# Patient Record
Sex: FEMALE | Race: WHITE | Employment: OTHER | URBAN - METROPOLITAN AREA
[De-identification: names, ages, dates, MRNs, and addresses within clinical notes are randomized per-mention and may not be internally consistent; named-entity substitution may affect disease eponyms.]

---

## 2017-01-18 ENCOUNTER — NURSE ONLY (OUTPATIENT)
Dept: INTERNAL MEDICINE CLINIC | Facility: CLINIC | Age: 77
End: 2017-01-18

## 2017-01-18 ENCOUNTER — LAB ENCOUNTER (OUTPATIENT)
Dept: LAB | Age: 77
End: 2017-01-18
Attending: INTERNAL MEDICINE
Payer: MEDICARE

## 2017-01-18 DIAGNOSIS — R73.9 HYPERGLYCEMIA: ICD-10-CM

## 2017-01-18 DIAGNOSIS — Z13.21 ENCOUNTER FOR VITAMIN DEFICIENCY SCREENING: ICD-10-CM

## 2017-01-18 DIAGNOSIS — Z00.00 ROUTINE GENERAL MEDICAL EXAMINATION AT A HEALTH CARE FACILITY: ICD-10-CM

## 2017-01-18 DIAGNOSIS — E78.00 PURE HYPERCHOLESTEROLEMIA: ICD-10-CM

## 2017-01-18 DIAGNOSIS — Z00.00 ROUTINE GENERAL MEDICAL EXAMINATION AT A HEALTH CARE FACILITY: Primary | ICD-10-CM

## 2017-01-18 LAB
25-HYDROXYVITAMIN D (TOTAL): 29.4 NG/ML (ref 30–100)
ALBUMIN SERPL-MCNC: 3.9 G/DL (ref 3.5–4.8)
ALP LIVER SERPL-CCNC: 130 U/L (ref 55–142)
ALT SERPL-CCNC: 19 U/L (ref 14–54)
AST SERPL-CCNC: 20 U/L (ref 15–41)
BASOPHILS # BLD AUTO: 0.03 X10(3) UL (ref 0–0.1)
BASOPHILS NFR BLD AUTO: 0.4 %
BILIRUB SERPL-MCNC: 0.5 MG/DL (ref 0.1–2)
BUN BLD-MCNC: 16 MG/DL (ref 8–20)
CALCIUM BLD-MCNC: 9.2 MG/DL (ref 8.3–10.3)
CHLORIDE: 107 MMOL/L (ref 101–111)
CO2: 28 MMOL/L (ref 22–32)
CREAT BLD-MCNC: 0.86 MG/DL (ref 0.55–1.02)
EOSINOPHIL # BLD AUTO: 0.05 X10(3) UL (ref 0–0.3)
EOSINOPHIL NFR BLD AUTO: 0.7 %
ERYTHROCYTE [DISTWIDTH] IN BLOOD BY AUTOMATED COUNT: 13.1 % (ref 11.5–16)
EST. AVERAGE GLUCOSE BLD GHB EST-MCNC: 120 MG/DL (ref 68–126)
GLUCOSE BLD-MCNC: 92 MG/DL (ref 70–99)
HBA1C MFR BLD HPLC: 5.8 % (ref ?–5.7)
HCT VFR BLD AUTO: 44.4 % (ref 34–50)
HGB BLD-MCNC: 15 G/DL (ref 12–16)
IMMATURE GRANULOCYTE COUNT: 0.03 X10(3) UL (ref 0–1)
IMMATURE GRANULOCYTE RATIO %: 0.4 %
LYMPHOCYTES # BLD AUTO: 2.18 X10(3) UL (ref 0.9–4)
LYMPHOCYTES NFR BLD AUTO: 31.2 %
M PROTEIN MFR SERPL ELPH: 7.1 G/DL (ref 6.1–8.3)
MCH RBC QN AUTO: 30.6 PG (ref 27–33.2)
MCHC RBC AUTO-ENTMCNC: 33.8 G/DL (ref 31–37)
MCV RBC AUTO: 90.6 FL (ref 81–100)
MONOCYTES # BLD AUTO: 0.62 X10(3) UL (ref 0.1–0.6)
MONOCYTES NFR BLD AUTO: 8.9 %
NEUTROPHIL ABS PRELIM: 4.08 X10 (3) UL (ref 1.3–6.7)
NEUTROPHILS # BLD AUTO: 4.08 X10(3) UL (ref 1.3–6.7)
NEUTROPHILS NFR BLD AUTO: 58.4 %
PLATELET # BLD AUTO: 205 10(3)UL (ref 150–450)
POTASSIUM SERPL-SCNC: 4.6 MMOL/L (ref 3.6–5.1)
RBC # BLD AUTO: 4.9 X10(6)UL (ref 3.8–5.1)
RED CELL DISTRIBUTION WIDTH-SD: 43 FL (ref 35.1–46.3)
SODIUM SERPL-SCNC: 141 MMOL/L (ref 136–144)
TSI SER-ACNC: 2.57 MIU/ML (ref 0.35–5.5)
WBC # BLD AUTO: 7 X10(3) UL (ref 4–13)

## 2017-01-18 PROCEDURE — 82306 VITAMIN D 25 HYDROXY: CPT

## 2017-01-18 PROCEDURE — 84443 ASSAY THYROID STIM HORMONE: CPT

## 2017-01-18 PROCEDURE — 36415 COLL VENOUS BLD VENIPUNCTURE: CPT

## 2017-01-18 PROCEDURE — 92551 PURE TONE HEARING TEST AIR: CPT | Performed by: INTERNAL MEDICINE

## 2017-01-18 PROCEDURE — 80053 COMPREHEN METABOLIC PANEL: CPT

## 2017-01-18 PROCEDURE — 85025 COMPLETE CBC W/AUTO DIFF WBC: CPT

## 2017-01-18 PROCEDURE — 83036 HEMOGLOBIN GLYCOSYLATED A1C: CPT

## 2017-01-18 PROCEDURE — 99173 VISUAL ACUITY SCREEN: CPT | Performed by: INTERNAL MEDICINE

## 2017-01-18 PROCEDURE — 83704 LIPOPROTEIN BLD QUAN PART: CPT

## 2017-01-18 PROCEDURE — 93000 ELECTROCARDIOGRAM COMPLETE: CPT | Performed by: INTERNAL MEDICINE

## 2017-01-22 LAB
HDL CHOLESTEROL: 75 MG/DL
HDL PARTICLE NUMBER: 37.3 UMOL/L
HDL SIZE: 9.7 NM
LARGE HDL PARTICLE NUMBER: 11.3 UMOL/L
LARGE VLDL PARTICLE NUMBER: 5.1 NMOL/L
LDL CHOLESTEROL: 131 MG/DL
LDL PARTICLE NUMBER BY NMR: 1451 NMOL/L
LDL PARTICLE SIZE: 21.7 NM
LDL SIZE: 21.7 NM
LP INSULIN RESISTANCE SCORE: 34
SMALL LDL PARTICLE NUMBER: 317 NMOL/L
SMALL LDL-P: 317 NMOL/L
TOTAL CHOLESTEROL: 227 MG/DL
TRIGLYCERIDES: 106 MG/DL
VLDL SIZE: 50.7 NM

## 2017-01-23 ENCOUNTER — OFFICE VISIT (OUTPATIENT)
Dept: INTERNAL MEDICINE CLINIC | Facility: CLINIC | Age: 77
End: 2017-01-23

## 2017-01-23 ENCOUNTER — APPOINTMENT (OUTPATIENT)
Dept: LAB | Age: 77
End: 2017-01-23
Attending: INTERNAL MEDICINE
Payer: MEDICARE

## 2017-01-23 VITALS
BODY MASS INDEX: 27.99 KG/M2 | WEIGHT: 156 LBS | HEIGHT: 62.5 IN | TEMPERATURE: 98 F | HEART RATE: 76 BPM | DIASTOLIC BLOOD PRESSURE: 84 MMHG | SYSTOLIC BLOOD PRESSURE: 126 MMHG | RESPIRATION RATE: 16 BRPM

## 2017-01-23 DIAGNOSIS — L65.9 HAIR LOSS: ICD-10-CM

## 2017-01-23 DIAGNOSIS — E78.00 PURE HYPERCHOLESTEROLEMIA: ICD-10-CM

## 2017-01-23 DIAGNOSIS — Z00.01 ENCOUNTER FOR GENERAL ADULT MEDICAL EXAMINATION WITH ABNORMAL FINDINGS: Primary | ICD-10-CM

## 2017-01-23 DIAGNOSIS — G47.33 OBSTRUCTIVE SLEEP APNEA (ADULT) (PEDIATRIC): ICD-10-CM

## 2017-01-23 DIAGNOSIS — R63.5 ABNORMAL WEIGHT GAIN: ICD-10-CM

## 2017-01-23 DIAGNOSIS — K21.00 GASTROESOPHAGEAL REFLUX DISEASE WITH ESOPHAGITIS: ICD-10-CM

## 2017-01-23 DIAGNOSIS — N39.41 URGE INCONTINENCE: ICD-10-CM

## 2017-01-23 LAB
C-REACTIVE PROTEIN: <0.29 MG/DL (ref ?–1)
SED RATE-ML: 11 MM/HR (ref 0–25)

## 2017-01-23 PROCEDURE — 85652 RBC SED RATE AUTOMATED: CPT

## 2017-01-23 PROCEDURE — 86038 ANTINUCLEAR ANTIBODIES: CPT

## 2017-01-23 PROCEDURE — 84165 PROTEIN E-PHORESIS SERUM: CPT

## 2017-01-23 PROCEDURE — 96160 PT-FOCUSED HLTH RISK ASSMT: CPT | Performed by: INTERNAL MEDICINE

## 2017-01-23 PROCEDURE — G0439 PPPS, SUBSEQ VISIT: HCPCS | Performed by: INTERNAL MEDICINE

## 2017-01-23 PROCEDURE — 99397 PER PM REEVAL EST PAT 65+ YR: CPT | Performed by: INTERNAL MEDICINE

## 2017-01-23 PROCEDURE — 86334 IMMUNOFIX E-PHORESIS SERUM: CPT

## 2017-01-23 PROCEDURE — 83883 ASSAY NEPHELOMETRY NOT SPEC: CPT

## 2017-01-23 PROCEDURE — 36415 COLL VENOUS BLD VENIPUNCTURE: CPT

## 2017-01-23 PROCEDURE — 86140 C-REACTIVE PROTEIN: CPT

## 2017-01-23 NOTE — PROGRESS NOTES
Marilyn name: Nicolas Brady  /Sex/Age: 814/1940 67year old female  Enc.  Date: 2017     Visit Information:  CC: Nicolas Brady is here for the [facility]ness Exam.  We are happy to be caring for you, Froylan Catalan, and are ready to support your effo CHOLEST 227* 01/18/2017   TRIG 130 11/17/2015   HDL 75 01/18/2017   * 01/18/2017   VLDL 26 11/17/2015   TCHDLRATIO 3.03 11/17/2015   NONHDLC 128 11/17/2015   CHOLHDLRATIO 3.3 04/19/2011       Patient Active Problem List:     Obstructive sleep apne as of 1/23/2017. Allergies:  No Known Allergies    OB/GYN History:  Patient's last menstrual period was 07/23/1992.       Family History:  Family History   Problem Relation Age of Onset   • Heart Attack Paternal Grandfather    • Alzheimer's [Other] Rebekah Fontana Good    How do you maintain positive mental well-being?: Social Interaction;Puzzles; Visiting Friends; Visiting Family    If you are a male age 38-65 or a female age 47-67, do you take aspirin?: No    Have you had any immunizations at another office such as for pain and visual disturbance. Respiratory: Negative for cough, chest tightness, shortness of breath and wheezing. Cardiovascular: Negative for chest pain, palpitations.    Gastrointestinal: Negative for nausea, vomiting, abdominal pain, diarrhea, con Low                   < 1000  Moderate         1000 - 1299  Borderline-High  1300 - 1599  High             1600 - 2000  Very High             > 2000  Performed at: [employer] [facility], [facility], [place], [address] [place] [address]   Small LDL Particle Number Saleem fully established.   Performed at: [employer] [facility], [facility], [place], [address] [facility]   Large HDL Particle Number Date: 01/18/2017   Value: 11.3  Ref Range >=4.8 umol/L Status: Final    Comment: Performed at: [employer] [facility], [facility], Resistant-->                        Percentile in Reference Population    Large VLDL-P      Low     25th     50th     75th     High                      <0.9    0.9      2.7      6.9      >6.9                                                             . Comment: Performed at: [facility] [address] [place] [address], [address] [place] [address]   LDL Size Date: 01/18/2017   Value: 21.7  Ref Range >=20.8 nm Status: Final    Comment: Performed at: [facility], [place] [address] [place] [address], [address] [place] [address]   [address] [place] [address] 6.4%      Diabetic:         >6.4%      Diabetic Control: <7.0%            Estimated Average Glucose Date: 01/18/2017   Value: 120  Ref Range  mg/dL Status: Final    Comment:   eAG is the estimated average glucose calculated from Hgb A1c according to x10(3) uL Status: Final   Monocyte Absolute Date: 01/18/2017   Value: 0.62* Ref Range 0.10-0.60 x10(3) uL Status: Final   Eosinophil Absolute Date: 01/18/2017   Value: 0.05  Ref Range 0.00-0.30 x10(3) uL Status: Final   Basophil Absolute Date: 01/18/2017 meat, fish and processed foods. The next healthiest diet is the Mediterranean diet which allows fish, wine and olives in addition to the above foods. Exercise regularly 30 minutes 5-6 days a week.  This amount has been shown to protect your heart and cristina

## 2017-01-24 LAB — ANA SCREEN: NEGATIVE

## 2017-01-25 PROBLEM — R63.5 ABNORMAL WEIGHT GAIN: Status: ACTIVE | Noted: 2017-01-25

## 2017-01-26 LAB
A/G RATIO: 1.59
ALBUMIN, SERUM: 4.18 G/DL (ref 3.5–4.8)
ALPHA-1 GLOBULIN: 0.2 G/DL (ref 0.1–0.3)
ALPHA-2 GLOBULIN: 0.84 G/DL (ref 0.6–1)
BETA GLOBULIN: 0.7 G/DL (ref 0.7–1.2)
GAMMA GLOBULIN: 0.88 G/DL (ref 0.6–1.6)
KAPPA FREE LIGHT CHAIN: 1.7 MG/DL (ref 0.33–1.94)
KAPPA/LAMBDA FLC RATIO: 1.24 (ref 0.26–1.65)
LAMBDA FREE LIGHT CHAIN: 1.38 MG/DL (ref 0.57–2.63)
TOTAL PROTEIN,SERUM: 6.8 G/DL (ref 6.1–8.3)

## 2017-02-13 DIAGNOSIS — N30.00 ACUTE CYSTITIS WITHOUT HEMATURIA: Primary | ICD-10-CM

## 2017-02-13 RX ORDER — OMEPRAZOLE 20 MG/1
20 CAPSULE, DELAYED RELEASE ORAL EVERY MORNING
Qty: 90 CAPSULE | Refills: 3 | Status: SHIPPED | OUTPATIENT
Start: 2017-02-13 | End: 2018-01-08

## 2017-02-13 RX ORDER — PHENAZOPYRIDINE HYDROCHLORIDE 200 MG/1
200 TABLET, FILM COATED ORAL 3 TIMES DAILY PRN
Qty: 6 TABLET | Refills: 0 | Status: SHIPPED | OUTPATIENT
Start: 2017-02-13 | End: 2017-08-08 | Stop reason: ALTCHOICE

## 2017-02-13 RX ORDER — NITROFURANTOIN 25; 75 MG/1; MG/1
100 CAPSULE ORAL 2 TIMES DAILY
Qty: 14 CAPSULE | Refills: 0 | Status: SHIPPED | OUTPATIENT
Start: 2017-02-13 | End: 2017-02-20

## 2017-02-13 RX ORDER — SIMVASTATIN 20 MG
20 TABLET ORAL NIGHTLY
Qty: 90 TABLET | Refills: 3 | Status: SHIPPED | OUTPATIENT
Start: 2017-02-13 | End: 2018-01-08

## 2017-02-13 NOTE — TELEPHONE ENCOUNTER
All labs are ok good ok to start Dr Rosalinda haskins oked antibiotics and pyridium pt verbalized understanding

## 2017-02-13 NOTE — TELEPHONE ENCOUNTER
Pt calling needs mail order meds refilled need a rush they are leaving in 5 days; also calling about blood work if ok can start rogain?, lastly pt got antibiotics and pyrdrium for her travels please call into local pharmacy

## 2017-02-20 ENCOUNTER — TELEPHONE (OUTPATIENT)
Dept: INTERNAL MEDICINE CLINIC | Facility: CLINIC | Age: 77
End: 2017-02-20

## 2017-02-20 ENCOUNTER — LAB ENCOUNTER (OUTPATIENT)
Dept: LAB | Age: 77
End: 2017-02-20
Attending: INTERNAL MEDICINE
Payer: MEDICARE

## 2017-02-20 DIAGNOSIS — Z87.440 HISTORY OF RECURRENT UTI (URINARY TRACT INFECTION): ICD-10-CM

## 2017-02-20 DIAGNOSIS — R39.15 URINARY URGENCY: Primary | ICD-10-CM

## 2017-02-20 DIAGNOSIS — R39.15 URINARY URGENCY: ICD-10-CM

## 2017-02-20 LAB
BILIRUB UR QL STRIP.AUTO: NEGATIVE
CLARITY UR REFRACT.AUTO: CLEAR
COLOR UR AUTO: YELLOW
GLUCOSE UR STRIP.AUTO-MCNC: NEGATIVE MG/DL
KETONES UR STRIP.AUTO-MCNC: NEGATIVE MG/DL
NITRITE UR QL STRIP.AUTO: NEGATIVE
PH UR STRIP.AUTO: 6 [PH] (ref 4.5–8)
PROT UR STRIP.AUTO-MCNC: NEGATIVE MG/DL
RBC UR QL AUTO: NEGATIVE
SP GR UR STRIP.AUTO: 1.01 (ref 1–1.03)
UROBILINOGEN UR STRIP.AUTO-MCNC: <2 MG/DL

## 2017-02-20 PROCEDURE — 87086 URINE CULTURE/COLONY COUNT: CPT

## 2017-02-20 PROCEDURE — 81001 URINALYSIS AUTO W/SCOPE: CPT

## 2017-02-20 NOTE — TELEPHONE ENCOUNTER
Emilio Holbrook is calling in letting  know that she has another UTI and is using her prescription from last May to treat it. Emilio Holbrook was in last spring just before leaving for [address] [facility].   had given her a prescription for an antibiotic just in case she

## 2017-02-21 ENCOUNTER — TELEPHONE (OUTPATIENT)
Dept: INTERNAL MEDICINE CLINIC | Facility: CLINIC | Age: 77
End: 2017-02-21

## 2017-02-21 NOTE — TELEPHONE ENCOUNTER
NKDAs    Pt requesting antibiotic to take with her to [place] for UTI. States she did take rx with her last year.      Last rx ordered:  [employer]

## 2017-02-21 NOTE — TELEPHONE ENCOUNTER
----- Message from Andrzej Cornejo MD sent at 2/21/2017  2:58 PM CST -----  Dyan Duron does not have a UTI.

## 2017-02-23 RX ORDER — NITROFURANTOIN 25; 75 MG/1; MG/1
100 CAPSULE ORAL 2 TIMES DAILY
Qty: 14 CAPSULE | Refills: 0 | Status: SHIPPED | OUTPATIENT
Start: 2017-02-23 | End: 2017-08-08 | Stop reason: ALTCHOICE

## 2017-02-23 RX ORDER — PHENAZOPYRIDINE HYDROCHLORIDE 200 MG/1
200 TABLET, FILM COATED ORAL 3 TIMES DAILY PRN
Qty: 6 TABLET | Refills: 0 | Status: SHIPPED | OUTPATIENT
Start: 2017-02-23 | End: 2018-01-04 | Stop reason: ALTCHOICE

## 2017-08-08 ENCOUNTER — OFFICE VISIT (OUTPATIENT)
Dept: INTERNAL MEDICINE CLINIC | Facility: CLINIC | Age: 77
End: 2017-08-08

## 2017-08-08 VITALS
HEIGHT: 62 IN | TEMPERATURE: 98 F | SYSTOLIC BLOOD PRESSURE: 120 MMHG | DIASTOLIC BLOOD PRESSURE: 78 MMHG | RESPIRATION RATE: 16 BRPM | HEART RATE: 68 BPM | BODY MASS INDEX: 27.79 KG/M2 | WEIGHT: 151 LBS

## 2017-08-08 DIAGNOSIS — E78.00 PURE HYPERCHOLESTEROLEMIA: ICD-10-CM

## 2017-08-08 DIAGNOSIS — R73.02 GLUCOSE INTOLERANCE (IMPAIRED GLUCOSE TOLERANCE): ICD-10-CM

## 2017-08-08 DIAGNOSIS — G47.33 OSA (OBSTRUCTIVE SLEEP APNEA): Primary | ICD-10-CM

## 2017-08-08 DIAGNOSIS — K21.00 GASTROESOPHAGEAL REFLUX DISEASE WITH ESOPHAGITIS: ICD-10-CM

## 2017-08-08 PROCEDURE — 99214 OFFICE O/P EST MOD 30 MIN: CPT | Performed by: INTERNAL MEDICINE

## 2017-08-08 NOTE — PROGRESS NOTES
Consuelo Landau is a 67year old female. 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  Use to see Dr. Sergey Braxton for 25 years 0      Past Medical History:   Diagnosis Date   • Blepharitis of both eyes 8/6/12- Dr NEIL Hill   • Colon polyp 9/22/2015    Mid transverse polyp- tubular adenoma, no dysplasia or malignancy    • Irritable bowel syndrome     quiescent   • Shingles 2013 with Dr. Agata quevedo. Would like her to lose another 5 pounds in the next 6 months, pt doing well with diet and exercise  Pure hypercholesterolemia- controlled.  CPM  Glucose intolerance (impaired glucose tolerance)- watch diet and daily walking, check h

## 2017-08-23 ENCOUNTER — TELEPHONE (OUTPATIENT)
Dept: INTERNAL MEDICINE CLINIC | Facility: CLINIC | Age: 77
End: 2017-08-23

## 2017-08-23 DIAGNOSIS — G47.09 OTHER INSOMNIA: ICD-10-CM

## 2017-08-23 DIAGNOSIS — Z12.31 ENCOUNTER FOR SCREENING MAMMOGRAM FOR MALIGNANT NEOPLASM OF BREAST: ICD-10-CM

## 2017-08-23 DIAGNOSIS — Z12.39 SCREENING BREAST EXAMINATION: Primary | ICD-10-CM

## 2017-08-23 NOTE — TELEPHONE ENCOUNTER
I will be in office tomorrow, I will send Ya Lord in for her then.  Let her know and route back to me please

## 2017-08-23 NOTE — TELEPHONE ENCOUNTER
Pt was notified regarding rx. Pt also requesting mammo orders.  Last mamm 8/09/16  Orders pended for approval

## 2017-08-23 NOTE — TELEPHONE ENCOUNTER
Pt states she has a Sleep Study on 8/28 and is requesting an Ambien to help her sleep. She has had a sleep study in the past and needed an Ambien to help her.

## 2017-08-24 ENCOUNTER — TELEPHONE (OUTPATIENT)
Dept: INTERNAL MEDICINE CLINIC | Facility: CLINIC | Age: 77
End: 2017-08-24

## 2017-08-24 RX ORDER — ZOLPIDEM TARTRATE 5 MG/1
5 TABLET ORAL NIGHTLY PRN
Qty: 10 TABLET | Refills: 0 | Status: SHIPPED | OUTPATIENT
Start: 2017-08-24 | End: 2018-01-04

## 2017-08-24 NOTE — TELEPHONE ENCOUNTER
Prescription sent to [facility] pharmacy, fax [phone] received confirmation, sent to triage.

## 2017-08-28 ENCOUNTER — OFFICE VISIT (OUTPATIENT)
Dept: SLEEP CENTER | Facility: HOSPITAL | Age: 77
End: 2017-08-28
Attending: INTERNAL MEDICINE
Payer: MEDICARE

## 2017-08-28 PROCEDURE — 95782 POLYSOM <6 YRS 4/> PARAMTRS: CPT

## 2017-08-31 NOTE — PROCEDURES
[address] [address]       Accredited by the [facility] Sleep [facility] ([facility]ASM)    PATIENT'S NAME:        Josy Dominique  ATTENDING PHYSICIAN:   Beto Espinoza M.D. REFERRING PHYSICIAN:   Herbert Cervantes M.D.   PAT central apneas and the remaining were obstructive. This corresponds to a total apnea and hypopnea index of 32 and a central apnea index of 19. The oxygen deric was 80%. The patient spends 99% of the record with a saturation of greater than 90%.   It shou

## 2017-09-01 ENCOUNTER — HOSPITAL ENCOUNTER (OUTPATIENT)
Dept: MAMMOGRAPHY | Age: 77
Discharge: HOME | End: 2017-09-01
Attending: INTERNAL MEDICINE
Payer: MEDICARE

## 2017-09-01 DIAGNOSIS — Z12.31 ENCOUNTER FOR SCREENING MAMMOGRAM FOR MALIGNANT NEOPLASM OF BREAST: ICD-10-CM

## 2017-09-01 PROCEDURE — 77067 SCR MAMMO BI INCL CAD: CPT | Performed by: INTERNAL MEDICINE

## 2017-11-09 ENCOUNTER — OFFICE VISIT (OUTPATIENT)
Dept: SLEEP CENTER | Facility: HOSPITAL | Age: 77
End: 2017-11-09
Attending: INTERNAL MEDICINE
Payer: MEDICARE

## 2017-11-09 PROCEDURE — 95810 POLYSOM 6/> YRS 4/> PARAM: CPT

## 2017-11-14 NOTE — PROCEDURES
[address] [address]       Accredited by the [facility] Sleep Medicine ([facility])    PATIENT'S NAME:        Bren Forrester  ATTENDING PHYSICIAN:   Mary Kemp M.D. REFERRING PHYSICIAN:   Mary Kemp M.D.   P onset latency was 4 minutes, REM onset latency 250 minutes. Arousal index was 28. RESPIRATORY MEASURES:  A total of 125 apneas and hypopneas were detected.   At this point, 8 were central apneas, 18 were central hypopneas; 99 of the events were obstruct

## 2017-12-12 PROBLEM — G47.31 CSA (CENTRAL SLEEP APNEA): Status: ACTIVE | Noted: 2017-12-12

## 2018-01-04 ENCOUNTER — LAB ENCOUNTER (OUTPATIENT)
Dept: LAB | Age: 78
End: 2018-01-04
Attending: INTERNAL MEDICINE
Payer: MEDICARE

## 2018-01-04 ENCOUNTER — OFFICE VISIT (OUTPATIENT)
Dept: INTERNAL MEDICINE CLINIC | Facility: CLINIC | Age: 78
End: 2018-01-04

## 2018-01-04 VITALS
DIASTOLIC BLOOD PRESSURE: 80 MMHG | TEMPERATURE: 98 F | HEART RATE: 68 BPM | HEIGHT: 62 IN | SYSTOLIC BLOOD PRESSURE: 130 MMHG | BODY MASS INDEX: 26.68 KG/M2 | RESPIRATION RATE: 16 BRPM | WEIGHT: 145 LBS

## 2018-01-04 DIAGNOSIS — R20.2 PARESTHESIAS: ICD-10-CM

## 2018-01-04 DIAGNOSIS — R20.2 PARESTHESIAS: Primary | ICD-10-CM

## 2018-01-04 DIAGNOSIS — G47.31 CSA (CENTRAL SLEEP APNEA): ICD-10-CM

## 2018-01-04 LAB
EST. AVERAGE GLUCOSE BLD GHB EST-MCNC: 123 MG/DL (ref 68–126)
HAV AB SERPL IA-ACNC: 544 PG/ML (ref 193–986)
HBA1C MFR BLD HPLC: 5.9 % (ref ?–5.7)
TSI SER-ACNC: 2.23 MIU/ML (ref 0.35–5.5)

## 2018-01-04 PROCEDURE — 84443 ASSAY THYROID STIM HORMONE: CPT | Performed by: INTERNAL MEDICINE

## 2018-01-04 PROCEDURE — 99213 OFFICE O/P EST LOW 20 MIN: CPT | Performed by: INTERNAL MEDICINE

## 2018-01-04 PROCEDURE — 82607 VITAMIN B-12: CPT | Performed by: INTERNAL MEDICINE

## 2018-01-04 PROCEDURE — 83036 HEMOGLOBIN GLYCOSYLATED A1C: CPT | Performed by: INTERNAL MEDICINE

## 2018-01-04 RX ORDER — OMEPRAZOLE 20 MG/1
CAPSULE, DELAYED RELEASE ORAL
Qty: 90 CAPSULE | OUTPATIENT
Start: 2018-01-04

## 2018-01-04 RX ORDER — SIMVASTATIN 20 MG
TABLET ORAL
Qty: 90 TABLET | OUTPATIENT
Start: 2018-01-04

## 2018-01-04 NOTE — PROGRESS NOTES
Iram Johnson is a 65year old female.   Patient presents with:  Tingling: tingling feeling in hands and feet that started three weeks ago but stopped when she stopped taking a sleep medication      HPI:     Patient here for tingling sensation in both ha malignancy    • Irritable bowel syndrome     quiescent   • Obstructive sleep apnea and central apnea 08/28/2017    Edward AHI 32 cental apnea AHI 19 SaO2 deric 80 %   • Shingles 2013    Subacute episode on nose      Social History:  Smoking status: Never S [E]      Vitamin B12 [E]    Meds & Refills for this Visit:  No prescriptions requested or ordered in this encounter    Imaging & Consults:  None    Return if symptoms worsen or fail to improve. There are no Patient Instructions on file for this visit.

## 2018-01-08 ENCOUNTER — TELEPHONE (OUTPATIENT)
Dept: INTERNAL MEDICINE CLINIC | Facility: CLINIC | Age: 78
End: 2018-01-08

## 2018-01-08 RX ORDER — OMEPRAZOLE 20 MG/1
20 CAPSULE, DELAYED RELEASE ORAL EVERY MORNING
Qty: 90 CAPSULE | Refills: 3 | Status: SHIPPED | OUTPATIENT
Start: 2018-01-08 | End: 2018-11-20

## 2018-01-08 RX ORDER — SIMVASTATIN 20 MG
20 TABLET ORAL NIGHTLY
Qty: 90 TABLET | Refills: 3 | Status: SHIPPED | OUTPATIENT
Start: 2018-01-08 | End: 2018-11-20

## 2018-01-08 NOTE — TELEPHONE ENCOUNTER
Previous Dr Eduard Sears pt. Will need refills sent to mail order   simvastatin 20 MG Oral Tab 90 tablet 3 2/13/2017    Sig :  Take 1 tablet (20 mg total) by mouth nightly.  Please do a rush order pt leaving in 5 days     Route:   Oral     Comment:   Please \"RUSH\

## 2018-01-08 NOTE — TELEPHONE ENCOUNTER
LOV: 1/4/18  Future office visit: 1/29/18  Last labs: 1/4/18 Vit B12 Tsh A1c   Last RX: 2/13/17 #90 #3 Refill for both scripts   Per protocol: Route to provider

## 2018-01-28 ENCOUNTER — MA CHART PREP (OUTPATIENT)
Dept: FAMILY MEDICINE CLINIC | Facility: CLINIC | Age: 78
End: 2018-01-28

## 2018-01-29 ENCOUNTER — OFFICE VISIT (OUTPATIENT)
Dept: INTERNAL MEDICINE CLINIC | Facility: CLINIC | Age: 78
End: 2018-01-29

## 2018-01-29 VITALS
SYSTOLIC BLOOD PRESSURE: 118 MMHG | WEIGHT: 147 LBS | BODY MASS INDEX: 27.05 KG/M2 | HEART RATE: 64 BPM | RESPIRATION RATE: 16 BRPM | TEMPERATURE: 98 F | HEIGHT: 62 IN | DIASTOLIC BLOOD PRESSURE: 68 MMHG

## 2018-01-29 DIAGNOSIS — K21.00 GASTROESOPHAGEAL REFLUX DISEASE WITH ESOPHAGITIS: ICD-10-CM

## 2018-01-29 DIAGNOSIS — M81.0 OSTEOPOROSIS, UNSPECIFIED OSTEOPOROSIS TYPE, UNSPECIFIED PATHOLOGICAL FRACTURE PRESENCE: ICD-10-CM

## 2018-01-29 DIAGNOSIS — G47.31 CSA (CENTRAL SLEEP APNEA): ICD-10-CM

## 2018-01-29 DIAGNOSIS — Z78.0 POST-MENOPAUSAL: ICD-10-CM

## 2018-01-29 DIAGNOSIS — E78.00 PURE HYPERCHOLESTEROLEMIA: ICD-10-CM

## 2018-01-29 DIAGNOSIS — Z00.00 ENCOUNTER FOR ANNUAL HEALTH EXAMINATION: Primary | ICD-10-CM

## 2018-01-29 DIAGNOSIS — E55.9 VITAMIN D DEFICIENCY: ICD-10-CM

## 2018-01-29 DIAGNOSIS — E74.39 GLUCOSE INTOLERANCE: ICD-10-CM

## 2018-01-29 PROBLEM — R63.5 ABNORMAL WEIGHT GAIN: Status: RESOLVED | Noted: 2017-01-25 | Resolved: 2018-01-29

## 2018-01-29 PROCEDURE — 99397 PER PM REEVAL EST PAT 65+ YR: CPT | Performed by: INTERNAL MEDICINE

## 2018-01-29 PROCEDURE — 96160 PT-FOCUSED HLTH RISK ASSMT: CPT | Performed by: INTERNAL MEDICINE

## 2018-01-29 PROCEDURE — 93000 ELECTROCARDIOGRAM COMPLETE: CPT | Performed by: INTERNAL MEDICINE

## 2018-01-29 PROCEDURE — G0439 PPPS, SUBSEQ VISIT: HCPCS | Performed by: INTERNAL MEDICINE

## 2018-01-29 NOTE — PROGRESS NOTES
HPI:   Iram Johnson is a 65year old female who presents for a MA (Medicare Advantage) Supervisit (Once per calendar year). 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    CSA- went back on acetazolamide, has mild instructed to get our office a copy of it for scanning into Epic. She has never smoked tobacco.    CAGE Alcohol screening   Marah Crabtree was screened for Alcohol abuse and had a score of 0 so is at low risk.     Patient Care Team: Patient Care hysterectomy (1992); upper gi endoscopy - referral (9/22/2015); and colonoscopy (09/22/2015).     Her family history includes Alzheimer's in her mother; Cancer (age of onset: 68) in her father; Heart Attack in her paternal grandfather; Stroke in her [de-identified] normal, no murmur, rub, or gallop   Abdomen:   Soft, non-tender, bowel sounds active all four quadrants,  no masses, no organomegaly   Pelvic: Deferred   Extremities: Extremities normal, atraumatic, no cyanosis or edema   Pulses: 2+ and symmetric   Skin: S Diet assessment: good     PLAN:  The patient indicates understanding of these issues and agrees to the plan. Reinforced healthy diet, lifestyle, and exercise. Return in about 6 months (around 7/29/2018) for f/u.      Kareem Sainz MD, 1/29/2018 Glaucoma, AA>50, > 65 No flowsheet data found. Bone Density Screening      Dexascan Every two years Last Dexa Scan:   XR DEXA BONE DENSITOMETRY (CPT=77080) 05/20/2016    No flowsheet data found.     Pap and Pelvic      Pap: Every 3 yrs age 21-65 Date Value   12/15/2017 4.60    No flowsheet data found. Creatinine  Annually CREATININE (mg/dL)   Date Value   05/07/2014 0.53     Creatinine (mg/dL)   Date Value   12/15/2017 0.96    No flowsheet data found.     Drug Serum Conc  Annually No results f

## 2018-01-29 NOTE — PATIENT INSTRUCTIONS
Anne Miles's SCREENING SCHEDULE   Tests on this list are recommended by your physician but may not be covered, or covered at this frequency, by your insurer. Please check with your insurance carrier before scheduling to verify coverage.    PEMA indicated for medical reasons Electrocardiogram date01/18/2017 Routine EKG is not a screening covered service except at the [place] to Medicare Visit    Abdominal aortic aneurysm screening (once between ages 73-68) IPPE only No results found for this or an patient. Chlamydia  Annually if high risk No results found for: CHLAMYDIA No flowsheet data found.     Screening Mammogram      Mammogram    Recommend Annually to at least age 76, and as needed after 76 There are no preventive care reminders to display information including definitions of the different types of Advance Directives. It also has the State forms available on it's website for anyone to review and print using their home computer and printer. (the forms are also available in [address])  www. [employer] car[employer]

## 2018-05-15 ENCOUNTER — APPOINTMENT (OUTPATIENT)
Dept: LAB | Age: 78
End: 2018-05-15
Attending: INTERNAL MEDICINE
Payer: MEDICARE

## 2018-05-15 DIAGNOSIS — E78.00 PURE HYPERCHOLESTEROLEMIA: ICD-10-CM

## 2018-05-15 DIAGNOSIS — E55.9 VITAMIN D DEFICIENCY: ICD-10-CM

## 2018-05-15 PROCEDURE — 82306 VITAMIN D 25 HYDROXY: CPT

## 2018-05-15 PROCEDURE — 80053 COMPREHEN METABOLIC PANEL: CPT

## 2018-05-15 PROCEDURE — 80061 LIPID PANEL: CPT

## 2018-05-29 ENCOUNTER — HOSPITAL ENCOUNTER (OUTPATIENT)
Dept: BONE DENSITY | Age: 78
Discharge: HOME | End: 2018-05-29
Attending: INTERNAL MEDICINE
Payer: MEDICARE

## 2018-05-29 DIAGNOSIS — Z78.0 POST-MENOPAUSAL: ICD-10-CM

## 2018-05-29 PROCEDURE — 77080 DXA BONE DENSITY AXIAL: CPT | Performed by: INTERNAL MEDICINE

## 2018-07-05 ENCOUNTER — OFFICE VISIT (OUTPATIENT)
Dept: INTERNAL MEDICINE CLINIC | Facility: CLINIC | Age: 78
End: 2018-07-05

## 2018-07-05 VITALS
DIASTOLIC BLOOD PRESSURE: 70 MMHG | HEART RATE: 64 BPM | TEMPERATURE: 99 F | HEIGHT: 62 IN | RESPIRATION RATE: 16 BRPM | WEIGHT: 142 LBS | BODY MASS INDEX: 26.13 KG/M2 | SYSTOLIC BLOOD PRESSURE: 110 MMHG

## 2018-07-05 DIAGNOSIS — E78.00 HIGH CHOLESTEROL: ICD-10-CM

## 2018-07-05 DIAGNOSIS — M85.88 OSTEOPENIA OF OTHER SITE: ICD-10-CM

## 2018-07-05 DIAGNOSIS — E74.39 GLUCOSE INTOLERANCE: Primary | ICD-10-CM

## 2018-07-05 DIAGNOSIS — E55.9 VITAMIN D DEFICIENCY: ICD-10-CM

## 2018-07-05 LAB
CARTRIDGE LOT#: 850 NUMERIC
HEMOGLOBIN A1C: 6.3 % (ref 4.3–5.6)

## 2018-07-05 PROCEDURE — 99214 OFFICE O/P EST MOD 30 MIN: CPT | Performed by: INTERNAL MEDICINE

## 2018-07-05 PROCEDURE — 83036 HEMOGLOBIN GLYCOSYLATED A1C: CPT | Performed by: INTERNAL MEDICINE

## 2018-07-05 NOTE — PROGRESS NOTES
Iram Johnson is a 65year old female. Patient presents with:   Follow - Up: Room 4  - 6 month follow up       HPI:     Patient here for f/u-  Glucose intolerance - pt fell off the diet and started eating cookies, etc. hgba1c up to 6.3, pt says she wi Smokeless tobacco: Never Used                      Alcohol use:  No              Family History   Problem Relation Age of Onset   • Heart Attack Paternal Grandfather    • Alzheimer's Brandan Lanier Mother       at 80   • Cancer Fat wellness. There are no Patient Instructions on file for this visit. The patient indicates understanding of these issues and agrees to the plan.

## 2018-09-07 ENCOUNTER — TELEPHONE (OUTPATIENT)
Dept: INTERNAL MEDICINE CLINIC | Facility: CLINIC | Age: 78
End: 2018-09-07

## 2018-09-07 DIAGNOSIS — Z12.31 ENCOUNTER FOR SCREENING MAMMOGRAM FOR MALIGNANT NEOPLASM OF BREAST: Primary | ICD-10-CM

## 2018-09-07 NOTE — TELEPHONE ENCOUNTER
Spoke with patient informed mammogram order placed. Patient verbalized understanding and agreeable to POC.

## 2018-09-07 NOTE — TELEPHONE ENCOUNTER
Faxed received from [facility] for drug utilization review on omeprazole . Placed in TB bin to be reviewed.

## 2018-09-17 ENCOUNTER — HOSPITAL ENCOUNTER (OUTPATIENT)
Dept: MAMMOGRAPHY | Age: 78
Discharge: HOME | End: 2018-09-17
Attending: INTERNAL MEDICINE
Payer: MEDICARE

## 2018-09-17 DIAGNOSIS — Z12.31 ENCOUNTER FOR SCREENING MAMMOGRAM FOR MALIGNANT NEOPLASM OF BREAST: ICD-10-CM

## 2018-09-17 PROCEDURE — 77063 BREAST TOMOSYNTHESIS BI: CPT | Performed by: INTERNAL MEDICINE

## 2018-09-17 PROCEDURE — 77067 SCR MAMMO BI INCL CAD: CPT | Performed by: INTERNAL MEDICINE

## 2018-11-21 RX ORDER — OMEPRAZOLE 20 MG/1
CAPSULE, DELAYED RELEASE ORAL
Qty: 90 CAPSULE | Refills: 0 | Status: SHIPPED | OUTPATIENT
Start: 2018-11-21 | End: 2019-01-04

## 2018-11-21 RX ORDER — SIMVASTATIN 20 MG
TABLET ORAL
Qty: 90 TABLET | Refills: 0 | Status: SHIPPED | OUTPATIENT
Start: 2018-11-21 | End: 2019-01-04

## 2018-11-21 NOTE — TELEPHONE ENCOUNTER
LFV: 07/05/2018 with TB  Future Appt: none on file  Last Rx:01/08/2018 for 90 days w/3 refills  Last Labs:5/15/2018 cbc, cmp, lipid  Per protocol filled   [employer] - pt due back in Anil please call to schedule

## 2018-11-28 NOTE — TELEPHONE ENCOUNTER
Future Appointments   Date Time Provider Celsa Griffin   1/4/2019  1:00 PM Morena Garcia MD [facility]     For Medicare Annual

## 2018-12-10 ENCOUNTER — TELEPHONE (OUTPATIENT)
Dept: INTERNAL MEDICINE CLINIC | Facility: CLINIC | Age: 78
End: 2018-12-10

## 2018-12-10 NOTE — TELEPHONE ENCOUNTER
Earl Ro called and recently had a sleep study done. 11/26/2018, they called with the results and told her that would like for Dr Kristine Irene to contact them? Patient states that they saw something during the sleep study with her heart.  Patient is seeing

## 2018-12-17 NOTE — TELEPHONE ENCOUNTER
Lm for pt ([id number] [address]ala [address] per HIPAA)to F/U if anything further still needed from our office. To call back at the office if so or if any further questions.

## 2019-01-04 ENCOUNTER — OFFICE VISIT (OUTPATIENT)
Dept: INTERNAL MEDICINE CLINIC | Facility: CLINIC | Age: 79
End: 2019-01-04
Payer: COMMERCIAL

## 2019-01-04 VITALS
RESPIRATION RATE: 16 BRPM | DIASTOLIC BLOOD PRESSURE: 80 MMHG | SYSTOLIC BLOOD PRESSURE: 118 MMHG | WEIGHT: 140.19 LBS | TEMPERATURE: 98 F | HEIGHT: 62 IN | HEART RATE: 64 BPM | BODY MASS INDEX: 25.8 KG/M2

## 2019-01-04 DIAGNOSIS — E78.49 OTHER HYPERLIPIDEMIA: ICD-10-CM

## 2019-01-04 DIAGNOSIS — R73.9 BLOOD GLUCOSE ELEVATED: ICD-10-CM

## 2019-01-04 DIAGNOSIS — K21.00 GASTROESOPHAGEAL REFLUX DISEASE WITH ESOPHAGITIS: ICD-10-CM

## 2019-01-04 DIAGNOSIS — G47.33 OBSTRUCTIVE SLEEP APNEA (ADULT) (PEDIATRIC): ICD-10-CM

## 2019-01-04 DIAGNOSIS — E74.39 GLUCOSE INTOLERANCE: ICD-10-CM

## 2019-01-04 DIAGNOSIS — M85.88 OSTEOPENIA OF OTHER SITE: ICD-10-CM

## 2019-01-04 DIAGNOSIS — Z00.00 ENCOUNTER FOR ANNUAL HEALTH EXAMINATION: Primary | ICD-10-CM

## 2019-01-04 LAB
CARTRIDGE LOT#: 953 NUMERIC
HEMOGLOBIN A1C: 5.5 % (ref 4.3–5.6)

## 2019-01-04 PROCEDURE — G0439 PPPS, SUBSEQ VISIT: HCPCS | Performed by: INTERNAL MEDICINE

## 2019-01-04 PROCEDURE — 96160 PT-FOCUSED HLTH RISK ASSMT: CPT | Performed by: INTERNAL MEDICINE

## 2019-01-04 PROCEDURE — 99397 PER PM REEVAL EST PAT 65+ YR: CPT | Performed by: INTERNAL MEDICINE

## 2019-01-04 PROCEDURE — 83036 HEMOGLOBIN GLYCOSYLATED A1C: CPT | Performed by: INTERNAL MEDICINE

## 2019-01-04 RX ORDER — ALENDRONATE SODIUM 35 MG/1
35 TABLET ORAL
Qty: 12 TABLET | Refills: 3 | Status: SHIPPED | OUTPATIENT
Start: 2019-01-04 | End: 2019-07-01

## 2019-01-04 RX ORDER — SIMVASTATIN 20 MG
TABLET ORAL
Qty: 90 TABLET | Refills: 3 | Status: SHIPPED | OUTPATIENT
Start: 2019-01-04 | End: 2019-07-01

## 2019-01-04 RX ORDER — OMEPRAZOLE 20 MG/1
CAPSULE, DELAYED RELEASE ORAL
Qty: 90 CAPSULE | Refills: 3 | Status: SHIPPED | OUTPATIENT
Start: 2019-01-04 | End: 2019-07-01
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
## 2019-01-04 NOTE — PATIENT INSTRUCTIONS
Marty Miles's SCREENING SCHEDULE   Tests on this list are recommended by your physician but may not be covered, or covered at this frequency, by your insurer. Please check with your insurance carrier before scheduling to verify coverage.    PEMA to Medicare Visit    Abdominal aortic aneurysm screening (once between ages 73-68) IPPE only No results found for this or any previous visit.  Limited to patients who meet one of the following criteria:   • Men who are 73-68 years old and have smoked more t Recommend Annually to at least age 76, and as needed after 76 There are no preventive care reminders to display for this patient.  Please get this Mammogram regularly   Immunizations      Influenza  Covered Annually Orders placed or performed in visit on to review and print using their home computer and printer. (the forms are also available in [address])  [email or web address]  This link also has information from the [facility] regarding Advance Directives.

## 2019-02-26 ENCOUNTER — TELEPHONE (OUTPATIENT)
Dept: INTERNAL MEDICINE CLINIC | Facility: CLINIC | Age: 79
End: 2019-02-26

## 2019-02-26 ENCOUNTER — HOSPITAL ENCOUNTER (OUTPATIENT)
Dept: CT IMAGING | Facility: HOSPITAL | Age: 79
Discharge: HOME | End: 2019-02-26
Attending: INTERNAL MEDICINE

## 2019-02-26 DIAGNOSIS — Z13.9 ENCOUNTER FOR SCREENING: ICD-10-CM

## 2019-02-26 DIAGNOSIS — R91.8 MASS OF LUNG: Primary | ICD-10-CM

## 2019-03-01 ENCOUNTER — LAB ENCOUNTER (OUTPATIENT)
Dept: LAB | Age: 79
End: 2019-03-01
Attending: INTERNAL MEDICINE
Payer: MEDICARE

## 2019-03-01 DIAGNOSIS — E78.49 OTHER HYPERLIPIDEMIA: ICD-10-CM

## 2019-03-01 DIAGNOSIS — K21.00 GASTROESOPHAGEAL REFLUX DISEASE WITH ESOPHAGITIS: ICD-10-CM

## 2019-03-01 DIAGNOSIS — G47.33 OBSTRUCTIVE SLEEP APNEA (ADULT) (PEDIATRIC): ICD-10-CM

## 2019-03-01 LAB
ALBUMIN SERPL-MCNC: 3.9 G/DL (ref 3.4–5)
ALBUMIN/GLOB SERPL: 1.1 {RATIO} (ref 1–2)
ALP LIVER SERPL-CCNC: 110 U/L (ref 55–142)
ALT SERPL-CCNC: 19 U/L (ref 13–56)
ANION GAP SERPL CALC-SCNC: 7 MMOL/L (ref 0–18)
AST SERPL-CCNC: 29 U/L (ref 15–37)
BASOPHILS # BLD AUTO: 0.03 X10(3) UL (ref 0–0.2)
BASOPHILS NFR BLD AUTO: 0.4 %
BILIRUB SERPL-MCNC: 0.9 MG/DL (ref 0.1–2)
BUN BLD-MCNC: 19 MG/DL (ref 7–18)
BUN/CREAT SERPL: 20.9 (ref 10–20)
CALCIUM BLD-MCNC: 9 MG/DL (ref 8.5–10.1)
CHLORIDE SERPL-SCNC: 105 MMOL/L (ref 98–107)
CHOLEST SMN-MCNC: 207 MG/DL (ref ?–200)
CO2 SERPL-SCNC: 26 MMOL/L (ref 21–32)
CREAT BLD-MCNC: 0.91 MG/DL (ref 0.55–1.02)
DEPRECATED RDW RBC AUTO: 42.4 FL (ref 35.1–46.3)
EOSINOPHIL # BLD AUTO: 0.06 X10(3) UL (ref 0–0.7)
EOSINOPHIL NFR BLD AUTO: 0.8 %
ERYTHROCYTE [DISTWIDTH] IN BLOOD BY AUTOMATED COUNT: 12.8 % (ref 11–15)
GLOBULIN PLAS-MCNC: 3.5 G/DL (ref 2.8–4.4)
GLUCOSE BLD-MCNC: 94 MG/DL (ref 70–99)
HCT VFR BLD AUTO: 45.2 % (ref 35–48)
HDLC SERPL-MCNC: 77 MG/DL (ref 40–59)
HGB BLD-MCNC: 14.6 G/DL (ref 12–16)
IMM GRANULOCYTES # BLD AUTO: 0.02 X10(3) UL (ref 0–1)
IMM GRANULOCYTES NFR BLD: 0.3 %
LDLC SERPL CALC-MCNC: 117 MG/DL (ref ?–100)
LYMPHOCYTES # BLD AUTO: 3.08 X10(3) UL (ref 1–4)
LYMPHOCYTES NFR BLD AUTO: 40.7 %
M PROTEIN MFR SERPL ELPH: 7.4 G/DL (ref 6.4–8.2)
MCH RBC QN AUTO: 29.3 PG (ref 26–34)
MCHC RBC AUTO-ENTMCNC: 32.3 G/DL (ref 31–37)
MCV RBC AUTO: 90.6 FL (ref 80–100)
MONOCYTES # BLD AUTO: 0.66 X10(3) UL (ref 0.1–1)
MONOCYTES NFR BLD AUTO: 8.7 %
NEUTROPHILS # BLD AUTO: 3.71 X10 (3) UL (ref 1.5–7.7)
NEUTROPHILS # BLD AUTO: 3.71 X10(3) UL (ref 1.5–7.7)
NEUTROPHILS NFR BLD AUTO: 49.1 %
NONHDLC SERPL-MCNC: 130 MG/DL (ref ?–130)
OSMOLALITY SERPL CALC.SUM OF ELEC: 288 MOSM/KG (ref 275–295)
PLATELET # BLD AUTO: 191 10(3)UL (ref 150–450)
POTASSIUM SERPL-SCNC: 4.6 MMOL/L (ref 3.5–5.1)
RBC # BLD AUTO: 4.99 X10(6)UL (ref 3.8–5.3)
SODIUM SERPL-SCNC: 138 MMOL/L (ref 136–145)
TRIGL SERPL-MCNC: 67 MG/DL (ref 30–149)
TSI SER-ACNC: 2.16 MIU/ML (ref 0.36–3.74)
VLDLC SERPL CALC-MCNC: 13 MG/DL (ref 0–30)
WBC # BLD AUTO: 7.6 X10(3) UL (ref 4–11)

## 2019-03-01 PROCEDURE — 84443 ASSAY THYROID STIM HORMONE: CPT

## 2019-03-01 PROCEDURE — 80061 LIPID PANEL: CPT

## 2019-03-01 PROCEDURE — 85025 COMPLETE CBC W/AUTO DIFF WBC: CPT

## 2019-03-01 PROCEDURE — 80053 COMPREHEN METABOLIC PANEL: CPT

## 2019-03-06 ENCOUNTER — TELEPHONE (OUTPATIENT)
Dept: INTERNAL MEDICINE CLINIC | Facility: CLINIC | Age: 79
End: 2019-03-06

## 2019-03-06 DIAGNOSIS — R91.8 MASS OF LUNG: Primary | ICD-10-CM

## 2019-03-06 NOTE — TELEPHONE ENCOUNTER
Spoke with patient advised that recent CMP shows stable kidney functions so not concerning of damage to kidneys. We discussed hydration following test to help rid the body of contrast and pt agreeable.   Also had further questions about incidental findings

## 2019-03-06 NOTE — TELEPHONE ENCOUNTER
Pt came into the office was unable to get through on the phone. She is scheduled for a test tomorrow, CT Chest,  and would like call today to discuss it. She is concerned over the iodine and her kidney function.      [facility] from the [employer] department, needs

## 2019-03-06 NOTE — TELEPHONE ENCOUNTER
I am ok to change CT chest to just contrast but there is no options for just contrast under the CT chest orders. Are you able to find it?

## 2019-03-06 NOTE — TELEPHONE ENCOUNTER
Called and spoke with [facility] department to inform, per TB, ok to change with contrast only (CPT [id number]). Asha Ingram verbalized understanding and agreed with POC.

## 2019-03-07 ENCOUNTER — HOSPITAL ENCOUNTER (OUTPATIENT)
Dept: CT IMAGING | Facility: HOSPITAL | Age: 79
Discharge: HOME | End: 2019-03-07
Attending: INTERNAL MEDICINE
Payer: MEDICARE

## 2019-03-07 DIAGNOSIS — R91.8 MASS OF LUNG: ICD-10-CM

## 2019-03-07 PROCEDURE — 71260 CT THORAX DX C+: CPT | Performed by: INTERNAL MEDICINE

## 2019-03-14 ENCOUNTER — TELEPHONE (OUTPATIENT)
Dept: INTERNAL MEDICINE CLINIC | Facility: CLINIC | Age: 79
End: 2019-03-14

## 2019-03-14 NOTE — TELEPHONE ENCOUNTER
LOV-1/4/2019 with TB  Spoke with patient stating x3-4 days heart fluttering and minimal SOB, No chest pain, No blurred vision, no headaches, No dizziness, no lightheadedness, no fever, no chills, no nausea, no vomiting, no numbness/tingling in arms or lowe

## 2019-03-14 NOTE — TELEPHONE ENCOUNTER
Called pt to inform, per TB, needs OV and will need EKG. Scheduled OV on 3/15 as requested. Advised to go to ER if sx worsening. Pt verbalized understanding and agreed with POC.

## 2019-03-14 NOTE — TELEPHONE ENCOUNTER
Patient is scheduled for a biopsy next week. The last few days she states her heart is fluttering. Patient states it is different then the heart palpitations when stressed.   Please advise

## 2019-03-15 ENCOUNTER — OFFICE VISIT (OUTPATIENT)
Dept: INTERNAL MEDICINE CLINIC | Facility: CLINIC | Age: 79
End: 2019-03-15
Payer: COMMERCIAL

## 2019-03-15 ENCOUNTER — TELEPHONE (OUTPATIENT)
Dept: INTERNAL MEDICINE CLINIC | Facility: CLINIC | Age: 79
End: 2019-03-15

## 2019-03-15 VITALS
HEART RATE: 64 BPM | BODY MASS INDEX: 25.55 KG/M2 | DIASTOLIC BLOOD PRESSURE: 76 MMHG | RESPIRATION RATE: 16 BRPM | WEIGHT: 144.19 LBS | SYSTOLIC BLOOD PRESSURE: 120 MMHG | HEIGHT: 63 IN

## 2019-03-15 DIAGNOSIS — R68.83 CHILLS (WITHOUT FEVER): ICD-10-CM

## 2019-03-15 DIAGNOSIS — R00.2 HEART PALPITATIONS: Primary | ICD-10-CM

## 2019-03-15 DIAGNOSIS — R91.8 MASS OF RIGHT LUNG: ICD-10-CM

## 2019-03-15 PROCEDURE — 99214 OFFICE O/P EST MOD 30 MIN: CPT | Performed by: INTERNAL MEDICINE

## 2019-03-15 PROCEDURE — 93000 ELECTROCARDIOGRAM COMPLETE: CPT | Performed by: INTERNAL MEDICINE

## 2019-03-15 NOTE — TELEPHONE ENCOUNTER
Pt was told by TB to wear a heart monitor on Monday. She forgot to let TB know that she is having a biopsy done on Tuesday at 6:45am. She stated she will not be able to wear it for 24 hrs if she goes Monday.  Would like to know if she should wait till [facility]

## 2019-03-15 NOTE — TELEPHONE ENCOUNTER
Called pt back. Advised she may wait until later in the week to wear the holter monitor, it is not an emergency. Pt understands and appreciated the call back.

## 2019-03-15 NOTE — PROGRESS NOTES
Nicolas Brady is a 77year old female. Patient presents with:  Heart Problem: cn room 3: patient is here for heart flutters starting 2 and a half weeks ago  Body ache and/or chills      HPI:     Patient here for heart palpitations for 2+ weeks.  Had adolfo central apnea 08/28/2017    Edward AHI 32 cental apnea AHI 19 SaO2 deric 80 %   • Shingles 2013    Subacute episode on nose   • Sleep apnea    • Visual impairment     glasses      Social History:  Social History    Tobacco Use      Smoking status: Never Sm orders of the defined types were placed in this encounter.       Meds & Refills for this Visit:  Requested Prescriptions      No prescriptions requested or ordered in this encounter       Imaging & Consults:  ELECTROCARDIOGRAM, COMPLETE  CARD MONITOR HOLTER

## 2019-03-19 ENCOUNTER — HOSPITAL ENCOUNTER (OUTPATIENT)
Dept: GENERAL RADIOLOGY | Facility: HOSPITAL | Age: 79
Discharge: HOME | End: 2019-03-19
Attending: RADIOLOGY
Payer: MEDICARE

## 2019-03-19 ENCOUNTER — HOSPITAL ENCOUNTER (OUTPATIENT)
Dept: CT IMAGING | Facility: HOSPITAL | Age: 79
Discharge: HOME | End: 2019-03-19
Attending: INTERNAL MEDICINE
Payer: MEDICARE

## 2019-03-19 ENCOUNTER — APPOINTMENT (OUTPATIENT)
Dept: LAB | Facility: HOSPITAL | Age: 79
End: 2019-03-19
Attending: INTERNAL MEDICINE
Payer: MEDICARE

## 2019-03-19 VITALS
BODY MASS INDEX: 24.8 KG/M2 | HEIGHT: 63 IN | OXYGEN SATURATION: 98 % | DIASTOLIC BLOOD PRESSURE: 46 MMHG | RESPIRATION RATE: 18 BRPM | SYSTOLIC BLOOD PRESSURE: 90 MMHG | TEMPERATURE: 98 F | HEART RATE: 62 BPM | WEIGHT: 140 LBS

## 2019-03-19 DIAGNOSIS — R91.8 LUNG MASS: ICD-10-CM

## 2019-03-19 DIAGNOSIS — R91.1 LUNG NODULE: ICD-10-CM

## 2019-03-19 DIAGNOSIS — R91.1 LUNG NODULE: Primary | ICD-10-CM

## 2019-03-19 LAB
INR BLD: 0.94 (ref 0.9–1.1)
PSA SERPL DL<=0.01 NG/ML-MCNC: 12.9 SECONDS (ref 12.5–14.7)

## 2019-03-19 PROCEDURE — 87206 SMEAR FLUORESCENT/ACID STAI: CPT | Performed by: INTERNAL MEDICINE

## 2019-03-19 PROCEDURE — 87070 CULTURE OTHR SPECIMN AEROBIC: CPT | Performed by: INTERNAL MEDICINE

## 2019-03-19 PROCEDURE — 99153 MOD SED SAME PHYS/QHP EA: CPT | Performed by: INTERNAL MEDICINE

## 2019-03-19 PROCEDURE — 87116 MYCOBACTERIA CULTURE: CPT | Performed by: INTERNAL MEDICINE

## 2019-03-19 PROCEDURE — 87205 SMEAR GRAM STAIN: CPT | Performed by: INTERNAL MEDICINE

## 2019-03-19 PROCEDURE — 36415 COLL VENOUS BLD VENIPUNCTURE: CPT

## 2019-03-19 PROCEDURE — 87102 FUNGUS ISOLATION CULTURE: CPT | Performed by: INTERNAL MEDICINE

## 2019-03-19 PROCEDURE — 99152 MOD SED SAME PHYS/QHP 5/>YRS: CPT | Performed by: INTERNAL MEDICINE

## 2019-03-19 PROCEDURE — 88312 SPECIAL STAINS GROUP 1: CPT | Performed by: INTERNAL MEDICINE

## 2019-03-19 PROCEDURE — 36410 VNPNXR 3YR/> PHY/QHP DX/THER: CPT

## 2019-03-19 PROCEDURE — 87176 TISSUE HOMOGENIZATION CULTR: CPT | Performed by: INTERNAL MEDICINE

## 2019-03-19 PROCEDURE — 88342 IMHCHEM/IMCYTCHM 1ST ANTB: CPT | Performed by: INTERNAL MEDICINE

## 2019-03-19 PROCEDURE — 87075 CULTR BACTERIA EXCEPT BLOOD: CPT | Performed by: INTERNAL MEDICINE

## 2019-03-19 PROCEDURE — 88305 TISSUE EXAM BY PATHOLOGIST: CPT | Performed by: INTERNAL MEDICINE

## 2019-03-19 PROCEDURE — 71045 X-RAY EXAM CHEST 1 VIEW: CPT | Performed by: RADIOLOGY

## 2019-03-19 PROCEDURE — 85610 PROTHROMBIN TIME: CPT

## 2019-03-19 PROCEDURE — 76937 US GUIDE VASCULAR ACCESS: CPT

## 2019-03-19 PROCEDURE — 32405 CT BIOPSY LUNG OR MEDIASTINUM (CPT=77012/32405): CPT | Performed by: INTERNAL MEDICINE

## 2019-03-19 PROCEDURE — 77012 CT SCAN FOR NEEDLE BIOPSY: CPT | Performed by: INTERNAL MEDICINE

## 2019-03-19 RX ORDER — MIDAZOLAM HYDROCHLORIDE 1 MG/ML
1 INJECTION INTRAMUSCULAR; INTRAVENOUS EVERY 5 MIN PRN
Status: ACTIVE | OUTPATIENT
Start: 2019-03-19 | End: 2019-03-19

## 2019-03-19 RX ORDER — ACETAMINOPHEN 325 MG/1
650 TABLET ORAL EVERY 6 HOURS PRN
Status: DISCONTINUED | OUTPATIENT
Start: 2019-03-19 | End: 2019-03-21

## 2019-03-19 RX ORDER — HYDROCODONE BITARTRATE AND ACETAMINOPHEN 5; 325 MG/1; MG/1
1 TABLET ORAL EVERY 4 HOURS PRN
Status: DISCONTINUED | OUTPATIENT
Start: 2019-03-19 | End: 2019-03-21

## 2019-03-19 RX ORDER — MORPHINE SULFATE 2 MG/ML
2 INJECTION, SOLUTION INTRAMUSCULAR; INTRAVENOUS EVERY 2 HOUR PRN
Status: DISCONTINUED | OUTPATIENT
Start: 2019-03-19 | End: 2019-03-21

## 2019-03-19 RX ORDER — SODIUM CHLORIDE 9 MG/ML
INJECTION, SOLUTION INTRAVENOUS CONTINUOUS
Status: DISCONTINUED | OUTPATIENT
Start: 2019-03-19 | End: 2019-03-21

## 2019-03-19 RX ORDER — FLUMAZENIL 0.1 MG/ML
0.2 INJECTION, SOLUTION INTRAVENOUS AS NEEDED
Status: DISCONTINUED | OUTPATIENT
Start: 2019-03-19 | End: 2019-03-21

## 2019-03-19 RX ORDER — MIDAZOLAM HYDROCHLORIDE 1 MG/ML
INJECTION INTRAMUSCULAR; INTRAVENOUS
Status: COMPLETED
Start: 2019-03-19 | End: 2019-03-19

## 2019-03-19 RX ORDER — HYDROCODONE BITARTRATE AND ACETAMINOPHEN 5; 325 MG/1; MG/1
2 TABLET ORAL EVERY 4 HOURS PRN
Status: DISCONTINUED | OUTPATIENT
Start: 2019-03-19 | End: 2019-03-21

## 2019-03-19 RX ORDER — NALOXONE HYDROCHLORIDE 0.4 MG/ML
80 INJECTION, SOLUTION INTRAMUSCULAR; INTRAVENOUS; SUBCUTANEOUS AS NEEDED
Status: DISCONTINUED | OUTPATIENT
Start: 2019-03-19 | End: 2019-03-21

## 2019-03-19 RX ADMIN — SODIUM CHLORIDE: 9 INJECTION, SOLUTION INTRAVENOUS at 08:10:00

## 2019-03-19 RX ADMIN — MIDAZOLAM HYDROCHLORIDE 1 MG: 1 INJECTION INTRAMUSCULAR; INTRAVENOUS at 08:21:00

## 2019-03-19 NOTE — IMAGING NOTE
Dr. Keven Youssef explained procedure to patient. Patient signed consent. Time out performed. Patient tolerated procedure well. Patient denies hemoptysis, cough, chest pain or shortness of breath.  Bacitracin and Tegaderm dressing applied by Dr Keven Youssef to incision

## 2019-03-19 NOTE — PROCEDURES
[facility]  Procedure Note    Alvino Rios Patient Status:  Outpatient    10/14/1940 MRN [id number]   [facility] CT Attending Cynthia Zarate MD   Hosp Day # 0 PCP Madison Mehta MD     Procedure: right lung biopsy    Pre-Procedu

## 2019-03-21 PROBLEM — B39.2 PULMONARY HISTOPLASMOSIS (HCC): Status: ACTIVE | Noted: 2019-03-21

## 2019-03-22 NOTE — PROGRESS NOTES
Please call patient. Great results! No cancer! This finding is consistent with a fungal infection - Histoplasmosis. She is relatively asymptomatic and I'm not sure if she will need treatment for this. Please have her see infectious disease.   I will pu

## 2019-03-22 NOTE — PROGRESS NOTES
Patient notified of results, verbalizes understanding. All questions answered.  Provided patient with Dr. Fabian Nance contact information with ID.

## 2019-03-26 ENCOUNTER — HOSPITAL ENCOUNTER (OUTPATIENT)
Dept: CV DIAGNOSTICS | Facility: HOSPITAL | Age: 79
Discharge: HOME | End: 2019-03-26
Attending: INTERNAL MEDICINE
Payer: MEDICARE

## 2019-03-26 DIAGNOSIS — R00.2 HEART PALPITATIONS: ICD-10-CM

## 2019-03-26 PROCEDURE — 93226 XTRNL ECG REC<48 HR SCAN A/R: CPT | Performed by: INTERNAL MEDICINE

## 2019-03-26 PROCEDURE — 93225 XTRNL ECG REC<48 HRS REC: CPT | Performed by: INTERNAL MEDICINE

## 2019-03-26 PROCEDURE — 93227 XTRNL ECG REC<48 HR R&I: CPT | Performed by: INTERNAL MEDICINE

## 2019-06-19 ENCOUNTER — HOSPITAL ENCOUNTER (OUTPATIENT)
Dept: CARDIOLOGY CLINIC | Facility: HOSPITAL | Age: 79
Discharge: HOME | End: 2019-06-19
Attending: INTERNAL MEDICINE

## 2019-06-19 DIAGNOSIS — Z13.9 ENCOUNTER FOR SCREENING: ICD-10-CM

## 2019-06-20 ENCOUNTER — TELEPHONE (OUTPATIENT)
Dept: INTERNAL MEDICINE CLINIC | Facility: CLINIC | Age: 79
End: 2019-06-20

## 2019-06-20 DIAGNOSIS — E04.1 THYROID CYST: Primary | ICD-10-CM

## 2019-06-21 ENCOUNTER — TELEPHONE (OUTPATIENT)
Dept: INTERNAL MEDICINE CLINIC | Facility: CLINIC | Age: 79
End: 2019-06-21

## 2019-06-21 NOTE — TELEPHONE ENCOUNTER
Per TB OV notes 3/15/19: Mass of right lung- CT guided lung biopsy planned next week. Patient will follow up with Dr. Milly Mo. Regarding CT order.

## 2019-06-21 NOTE — TELEPHONE ENCOUNTER
Central sched called stating pt called them to sched CT scan but not sure of what and there are no orders in her chart-please check w/TB and place orders then call pt when done to let her know

## 2019-07-01 ENCOUNTER — OFFICE VISIT (OUTPATIENT)
Dept: INTERNAL MEDICINE CLINIC | Facility: CLINIC | Age: 79
End: 2019-07-01
Payer: COMMERCIAL

## 2019-07-01 VITALS
BODY MASS INDEX: 26.51 KG/M2 | RESPIRATION RATE: 16 BRPM | WEIGHT: 149.63 LBS | DIASTOLIC BLOOD PRESSURE: 80 MMHG | SYSTOLIC BLOOD PRESSURE: 132 MMHG | HEART RATE: 68 BPM | HEIGHT: 63 IN

## 2019-07-01 DIAGNOSIS — R00.2 HEART PALPITATIONS: Primary | ICD-10-CM

## 2019-07-01 DIAGNOSIS — E04.1 THYROID CYST: ICD-10-CM

## 2019-07-01 DIAGNOSIS — K21.00 GASTROESOPHAGEAL REFLUX DISEASE WITH ESOPHAGITIS: ICD-10-CM

## 2019-07-01 DIAGNOSIS — E78.49 OTHER HYPERLIPIDEMIA: ICD-10-CM

## 2019-07-01 DIAGNOSIS — R03.0 ELEVATED BP WITHOUT DIAGNOSIS OF HYPERTENSION: ICD-10-CM

## 2019-07-01 PROCEDURE — 99214 OFFICE O/P EST MOD 30 MIN: CPT | Performed by: INTERNAL MEDICINE

## 2019-07-01 RX ORDER — SIMVASTATIN 20 MG
TABLET ORAL
Qty: 90 TABLET | Refills: 3 | Status: SHIPPED | OUTPATIENT
Start: 2019-07-01 | End: 2020-05-01

## 2019-07-01 RX ORDER — OMEPRAZOLE 20 MG/1
CAPSULE, DELAYED RELEASE ORAL
Qty: 90 CAPSULE | Refills: 3 | Status: SHIPPED | OUTPATIENT
Start: 2019-07-01 | End: 2020-05-01

## 2019-07-01 RX ORDER — ALENDRONATE SODIUM 35 MG/1
35 TABLET ORAL
Qty: 12 TABLET | Refills: 3 | Status: SHIPPED | OUTPATIENT
Start: 2019-07-01 | End: 2020-02-15

## 2019-07-01 NOTE — PROGRESS NOTES
Jayden Gillette is a 77year old female. Patient presents with:   Follow - Up: cn room 4: follow up on heart flutter , thyroid, reflux, cholesterol      HPI:     Here for f/u-  Heart palpitations have resolved, holter benign, tsh wnl, no symptoms since sh History:   Diagnosis Date   • Blepharitis of both eyes 8/6/12- Dr NEIL Hill   • Colon polyp 9/22/2015    Mid transverse polyp- tubular adenoma, no dysplasia or malignancy    • Esophageal reflux    • High cholesterol    • Irritable bowel syndrome     kerri CPM  Gastroesophageal reflux disease with esophagitis- controlled on PPI, CPM  Thyroid cyst - check dedicated thyroid ultrasoun   elevated BP without diagnosis of hypertension- monitor numbers, low salt diet        Meds & Refills for this Visit:  Requested

## 2019-07-22 ENCOUNTER — HOSPITAL ENCOUNTER (OUTPATIENT)
Dept: ULTRASOUND IMAGING | Facility: HOSPITAL | Age: 79
Discharge: HOME | End: 2019-07-22
Attending: INTERNAL MEDICINE
Payer: MEDICARE

## 2019-07-22 DIAGNOSIS — E04.1 THYROID CYST: ICD-10-CM

## 2019-07-22 PROCEDURE — 76536 US EXAM OF HEAD AND NECK: CPT | Performed by: INTERNAL MEDICINE

## 2019-07-29 ENCOUNTER — TELEPHONE (OUTPATIENT)
Dept: INTERNAL MEDICINE CLINIC | Facility: CLINIC | Age: 79
End: 2019-07-29

## 2019-07-29 NOTE — TELEPHONE ENCOUNTER
Notes recorded by Connie Pyle MD on 7/23/2019 at 9:37 PM CDT  Multinodular thyroid with gland enlargement. Would like her to see Endocrinologist Dr. Qamar Quesada for consultation.  This can be within 3-4 months, not an emergency    Called pt to inform, per TB,

## 2019-07-29 NOTE — TELEPHONE ENCOUNTER
Needs appt sooner with Dr. Grace Saba, endroconologist     Pt can not get into October. Was told to FU with him from our office. See 7-22-19 US for thyroid. Can TB get her a sooner appt? Is it necessary to be sooner according to TB?

## 2019-09-09 PROBLEM — B39.2: Status: ACTIVE | Noted: 2019-09-09

## 2019-09-09 PROBLEM — G47.31 CENTRAL SLEEP APNEA: Status: ACTIVE | Noted: 2019-09-09

## 2019-10-01 PROBLEM — E04.2 MULTINODULAR GOITER (NONTOXIC): Status: ACTIVE | Noted: 2019-10-01

## 2019-12-05 ENCOUNTER — HOSPITAL ENCOUNTER (OUTPATIENT)
Dept: ULTRASOUND IMAGING | Facility: HOSPITAL | Age: 79
Discharge: HOME | End: 2019-12-05
Attending: INTERNAL MEDICINE
Payer: MEDICARE

## 2019-12-05 ENCOUNTER — HOSPITAL ENCOUNTER (OUTPATIENT)
Dept: CT IMAGING | Facility: HOSPITAL | Age: 79
Discharge: HOME | End: 2019-12-05
Attending: INTERNAL MEDICINE
Payer: MEDICARE

## 2019-12-05 DIAGNOSIS — E04.2 MULTINODULAR GOITER (NONTOXIC): ICD-10-CM

## 2019-12-05 DIAGNOSIS — R91.8 LUNG MASS: ICD-10-CM

## 2019-12-05 DIAGNOSIS — E04.2 MULTINODULAR NON-TOXIC GOITER: ICD-10-CM

## 2019-12-05 PROCEDURE — 10005 FNA BX W/US GDN 1ST LES: CPT | Performed by: INTERNAL MEDICINE

## 2019-12-05 PROCEDURE — 88173 CYTOPATH EVAL FNA REPORT: CPT | Performed by: INTERNAL MEDICINE

## 2019-12-05 PROCEDURE — 10006 FNA BX W/US GDN EA ADDL: CPT | Performed by: INTERNAL MEDICINE

## 2019-12-05 PROCEDURE — 71250 CT THORAX DX C-: CPT | Performed by: INTERNAL MEDICINE

## 2019-12-06 NOTE — PROGRESS NOTES
Hayden Degree - your nodules are stable. We need to follow them again next year. Will order CT scan.   IF everything remains stable I'll see you in a year (Message sent to patient via [address])

## 2020-01-02 ENCOUNTER — TELEPHONE (OUTPATIENT)
Dept: INTERNAL MEDICINE CLINIC | Facility: CLINIC | Age: 80
End: 2020-01-02

## 2020-02-03 ENCOUNTER — TELEPHONE (OUTPATIENT)
Dept: INTERNAL MEDICINE CLINIC | Facility: CLINIC | Age: 80
End: 2020-02-03

## 2020-02-03 DIAGNOSIS — E78.00 PURE HYPERCHOLESTEROLEMIA: ICD-10-CM

## 2020-02-03 DIAGNOSIS — G47.33 OBSTRUCTIVE SLEEP APNEA (ADULT) (PEDIATRIC): ICD-10-CM

## 2020-02-03 DIAGNOSIS — K21.00 GASTROESOPHAGEAL REFLUX DISEASE WITH ESOPHAGITIS: ICD-10-CM

## 2020-02-03 DIAGNOSIS — Z00.00 ROUTINE GENERAL MEDICAL EXAMINATION AT A HEALTH CARE FACILITY: Primary | ICD-10-CM

## 2020-02-03 NOTE — TELEPHONE ENCOUNTER
Future Appointments   Date Time Provider Celsa Gaby   2/15/2020  9:40 AM Diana Winslow MD [facility]   4/1/2020 10:40 AM GUSTABO Garcia [facility]       Pt would like fasting labs sent to [facility] pl[facility].  Pt aware to complete 5-7 d

## 2020-02-04 ENCOUNTER — TELEPHONE (OUTPATIENT)
Dept: INTERNAL MEDICINE CLINIC | Facility: CLINIC | Age: 80
End: 2020-02-04

## 2020-02-04 DIAGNOSIS — R92.2 DENSE BREAST: ICD-10-CM

## 2020-02-04 DIAGNOSIS — Z12.31 ENCOUNTER FOR SCREENING MAMMOGRAM FOR MALIGNANT NEOPLASM OF BREAST: Primary | ICD-10-CM

## 2020-02-04 NOTE — TELEPHONE ENCOUNTER
Last mammo completed on 9/17/2018,   RECOMMENDATIONS:    FOLLOW-UP: BILATERAL BREASTS. One year follow-up mammogram    mammo order pended for your review and approval if ok. Please advise. Left LE Active ROM was WFL (within functional limits)/Left UE Active ROM was WFL (within functional limits)/Pt unable to move R UE /LE

## 2020-02-05 NOTE — TELEPHONE ENCOUNTER
Called pt to inform mammogram ordered as requested. No further questions or concerns. Pt verbalized understanding and agreed with POC.

## 2020-02-11 ENCOUNTER — APPOINTMENT (OUTPATIENT)
Dept: LAB | Age: 80
End: 2020-02-11
Attending: INTERNAL MEDICINE
Payer: MEDICARE

## 2020-02-11 LAB
ALBUMIN SERPL-MCNC: 3.7 G/DL (ref 3.4–5)
ALBUMIN/GLOB SERPL: 1 {RATIO} (ref 1–2)
ALP LIVER SERPL-CCNC: 117 U/L (ref 55–142)
ALT SERPL-CCNC: 15 U/L (ref 13–56)
ANION GAP SERPL CALC-SCNC: 4 MMOL/L (ref 0–18)
AST SERPL-CCNC: 18 U/L (ref 15–37)
BASOPHILS # BLD AUTO: 0.03 X10(3) UL (ref 0–0.2)
BASOPHILS NFR BLD AUTO: 0.4 %
BILIRUB SERPL-MCNC: 0.7 MG/DL (ref 0.1–2)
BUN BLD-MCNC: 16 MG/DL (ref 7–18)
BUN/CREAT SERPL: 17.4 (ref 10–20)
CALCIUM BLD-MCNC: 9.1 MG/DL (ref 8.5–10.1)
CHLORIDE SERPL-SCNC: 107 MMOL/L (ref 98–112)
CHOLEST SMN-MCNC: 255 MG/DL (ref ?–200)
CO2 SERPL-SCNC: 26 MMOL/L (ref 21–32)
CREAT BLD-MCNC: 0.92 MG/DL (ref 0.55–1.02)
DEPRECATED RDW RBC AUTO: 43.6 FL (ref 35.1–46.3)
EOSINOPHIL # BLD AUTO: 0.07 X10(3) UL (ref 0–0.7)
EOSINOPHIL NFR BLD AUTO: 1 %
ERYTHROCYTE [DISTWIDTH] IN BLOOD BY AUTOMATED COUNT: 13 % (ref 11–15)
GLOBULIN PLAS-MCNC: 3.7 G/DL (ref 2.8–4.4)
GLUCOSE BLD-MCNC: 92 MG/DL (ref 70–99)
HCT VFR BLD AUTO: 45.9 % (ref 35–48)
HDLC SERPL-MCNC: 63 MG/DL (ref 40–59)
HGB BLD-MCNC: 14.8 G/DL (ref 12–16)
IMM GRANULOCYTES # BLD AUTO: 0.03 X10(3) UL (ref 0–1)
IMM GRANULOCYTES NFR BLD: 0.4 %
LDLC SERPL CALC-MCNC: 175 MG/DL (ref ?–100)
LYMPHOCYTES # BLD AUTO: 2.85 X10(3) UL (ref 1–4)
LYMPHOCYTES NFR BLD AUTO: 42 %
M PROTEIN MFR SERPL ELPH: 7.4 G/DL (ref 6.4–8.2)
MCH RBC QN AUTO: 29.4 PG (ref 26–34)
MCHC RBC AUTO-ENTMCNC: 32.2 G/DL (ref 31–37)
MCV RBC AUTO: 91.3 FL (ref 80–100)
MONOCYTES # BLD AUTO: 0.56 X10(3) UL (ref 0.1–1)
MONOCYTES NFR BLD AUTO: 8.2 %
NEUTROPHILS # BLD AUTO: 3.25 X10 (3) UL (ref 1.5–7.7)
NEUTROPHILS # BLD AUTO: 3.25 X10(3) UL (ref 1.5–7.7)
NEUTROPHILS NFR BLD AUTO: 48 %
NONHDLC SERPL-MCNC: 192 MG/DL (ref ?–130)
OSMOLALITY SERPL CALC.SUM OF ELEC: 285 MOSM/KG (ref 275–295)
PATIENT FASTING Y/N/NP: YES
PATIENT FASTING Y/N/NP: YES
PLATELET # BLD AUTO: 208 10(3)UL (ref 150–450)
POTASSIUM SERPL-SCNC: 4.4 MMOL/L (ref 3.5–5.1)
RBC # BLD AUTO: 5.03 X10(6)UL (ref 3.8–5.3)
SODIUM SERPL-SCNC: 137 MMOL/L (ref 136–145)
TRIGL SERPL-MCNC: 86 MG/DL (ref 30–149)
TSI SER-ACNC: 1.81 MIU/ML (ref 0.36–3.74)
VLDLC SERPL CALC-MCNC: 17 MG/DL (ref 0–30)
WBC # BLD AUTO: 6.8 X10(3) UL (ref 4–11)

## 2020-02-12 ENCOUNTER — HOSPITAL ENCOUNTER (OUTPATIENT)
Dept: MAMMOGRAPHY | Age: 80
Discharge: HOME | End: 2020-02-12
Attending: INTERNAL MEDICINE
Payer: MEDICARE

## 2020-02-12 DIAGNOSIS — Z12.31 ENCOUNTER FOR SCREENING MAMMOGRAM FOR MALIGNANT NEOPLASM OF BREAST: ICD-10-CM

## 2020-02-12 DIAGNOSIS — R92.2 DENSE BREAST: ICD-10-CM

## 2020-02-12 PROCEDURE — 77063 BREAST TOMOSYNTHESIS BI: CPT | Performed by: INTERNAL MEDICINE

## 2020-02-12 PROCEDURE — 77067 SCR MAMMO BI INCL CAD: CPT | Performed by: INTERNAL MEDICINE

## 2020-02-13 NOTE — TELEPHONE ENCOUNTER
Pt returned call. Pt stated she had EDW CARD PV SCREENING [2060] done today and asked if we received results. Advised we have not and we call once results are received. Pt had questions about U/S thyroid and why it was ordered as well.  Per CT Chest on 3 nondistended/soft

## 2020-02-15 ENCOUNTER — OFFICE VISIT (OUTPATIENT)
Dept: INTERNAL MEDICINE CLINIC | Facility: CLINIC | Age: 80
End: 2020-02-15
Payer: COMMERCIAL

## 2020-02-15 VITALS
RESPIRATION RATE: 16 BRPM | HEART RATE: 60 BPM | SYSTOLIC BLOOD PRESSURE: 138 MMHG | HEIGHT: 61.25 IN | BODY MASS INDEX: 27.81 KG/M2 | WEIGHT: 149.19 LBS | DIASTOLIC BLOOD PRESSURE: 70 MMHG

## 2020-02-15 DIAGNOSIS — M85.89 OSTEOPENIA OF MULTIPLE SITES: ICD-10-CM

## 2020-02-15 DIAGNOSIS — N32.81 OAB (OVERACTIVE BLADDER): ICD-10-CM

## 2020-02-15 DIAGNOSIS — Z00.00 ENCOUNTER FOR ANNUAL HEALTH EXAMINATION: Primary | ICD-10-CM

## 2020-02-15 DIAGNOSIS — E78.00 PURE HYPERCHOLESTEROLEMIA: ICD-10-CM

## 2020-02-15 DIAGNOSIS — Z78.0 POST-MENOPAUSAL: ICD-10-CM

## 2020-02-15 DIAGNOSIS — K21.00 GASTROESOPHAGEAL REFLUX DISEASE WITH ESOPHAGITIS: ICD-10-CM

## 2020-02-15 PROBLEM — N18.30 CKD (CHRONIC KIDNEY DISEASE) STAGE 3, GFR 30-59 ML/MIN (HCC): Chronic | Status: ACTIVE | Noted: 2020-02-15

## 2020-02-15 PROCEDURE — 99397 PER PM REEVAL EST PAT 65+ YR: CPT | Performed by: INTERNAL MEDICINE

## 2020-02-15 PROCEDURE — 96160 PT-FOCUSED HLTH RISK ASSMT: CPT | Performed by: INTERNAL MEDICINE

## 2020-02-15 PROCEDURE — G0439 PPPS, SUBSEQ VISIT: HCPCS | Performed by: INTERNAL MEDICINE

## 2020-02-15 RX ORDER — ALENDRONATE SODIUM 35 MG/1
35 TABLET ORAL
Qty: 12 TABLET | Refills: 3 | Status: SHIPPED | OUTPATIENT
Start: 2020-02-15

## 2020-02-15 NOTE — PATIENT INSTRUCTIONS
Glynn Miles's SCREENING SCHEDULE   Tests on this list are recommended by your physician but may not be covered, or covered at this frequency, by your insurer. Please check with your insurance carrier before scheduling to verify coverage.    PEMA Welcome to Medicare Visit    Abdominal aortic aneurysm screening (once between ages 73-68) IPPE only No results found for this or any previous visit.  Limited to patients who meet one of the following criteria:   • Men who are 73-68 years old and have smoke Mammogram    Recommend Annually to at least age 76, and as needed after 76 There are no preventive care reminders to display for this patient.  Please get this Mammogram regularly   Immunizations      Influenza  Covered Annually Orders placed or performed i types of Advance Directives. It also has the State forms available on it's website for anyone to review and print using their home computer and printer. (the forms are also available in [address])  [email or web address]  This link also has information from

## 2020-02-15 NOTE — PROGRESS NOTES
HPI:   Reji Martin is a [de-identified]year old female who presents for a MA (Medicare Advantage) Supervisit (Once per calendar year). Patient under stress from her  having stroke and shingles.  Otherwise, doing ok  HL- held simvastatin for 3 weeks to Osteopenia     Actinic keratoses     Urge incontinence     Rosacea     Colon polyps     Overweight; goal: 140 lbs.      Gastroesophageal reflux disease with esophagitis     CSA (central sleep apnea)     Glucose intolerance     Pulmonary histoplasmosis ([facility] [address] (2009); total abdom hysterectomy (1992); upper gi endoscopy - referral (9/22/2015); and colonoscopy (09/22/2015).     Her family history includes Alzheimer's in her mother; Cancer (age of onset: 68) in her father; Heart Attack in her paternal grandfather; S organomegaly   Pelvic: Deferred   Extremities: Extremities normal, atraumatic, no cyanosis or edema   Pulses: 2+ and symmetric   Skin: Skin color, texture, turgor normal, no rashes or lesions   Lymph nodes: Cervical, supraclavicular, and axillary nodes nor appetite been poor?: No  How does the patient maintain a good energy level?: Appropriate Exercise  How would you describe your daily physical activity?: Moderate  How would you describe your current health state?: Good  How do you maintain positive mental display for this patient. Update Health Maintenance if applicable    Chlamydia  Annually if high risk No results found for: CHLAMYDIA No flowsheet data found.     Screening Mammogram      Mammogram Annually to 76, then as discussed There are no preventive c

## 2020-05-01 DIAGNOSIS — K21.00 GASTROESOPHAGEAL REFLUX DISEASE WITH ESOPHAGITIS: ICD-10-CM

## 2020-05-01 DIAGNOSIS — M85.89 OSTEOPENIA OF MULTIPLE SITES: ICD-10-CM

## 2020-05-01 DIAGNOSIS — E78.49 OTHER HYPERLIPIDEMIA: ICD-10-CM

## 2020-05-01 RX ORDER — OMEPRAZOLE 20 MG/1
CAPSULE, DELAYED RELEASE ORAL
Qty: 90 CAPSULE | Refills: 3 | Status: SHIPPED | OUTPATIENT
Start: 2020-05-01 | End: 2020-05-05

## 2020-05-01 RX ORDER — SIMVASTATIN 20 MG
TABLET ORAL
Qty: 90 TABLET | Refills: 1 | Status: SHIPPED | OUTPATIENT
Start: 2020-05-01 | End: 2020-05-05

## 2020-05-01 NOTE — TELEPHONE ENCOUNTER
Last OV: 2/15/20 annual PE    Upcoming OV: no upcoming appt     Latest labs: 2/11/20 TSH w/ ref, Lipid, CMP and CBC    Latest RX: omeprazole 20 MG Oral Capsule Delayed Release 90 caps 3 refills on 7/1/19    simvastatin 20 MG Oral Tab 90 tabs 3 refills on 7

## 2020-05-05 DIAGNOSIS — E78.49 OTHER HYPERLIPIDEMIA: ICD-10-CM

## 2020-05-05 DIAGNOSIS — K21.00 GASTROESOPHAGEAL REFLUX DISEASE WITH ESOPHAGITIS: ICD-10-CM

## 2020-05-05 RX ORDER — SIMVASTATIN 20 MG
TABLET ORAL
Qty: 90 TABLET | Refills: 1 | Status: SHIPPED | OUTPATIENT
Start: 2020-05-05 | End: 2020-10-20

## 2020-05-05 RX ORDER — OMEPRAZOLE 20 MG/1
CAPSULE, DELAYED RELEASE ORAL
Qty: 90 CAPSULE | Refills: 3 | Status: SHIPPED | OUTPATIENT
Start: 2020-05-05 | End: 2021-03-29

## 2020-05-05 NOTE — TELEPHONE ENCOUNTER
Last OV: 2/15/20 annual PE     Upcoming OV: no upcoming appt     Latest labs: 2/11/20 TSH w/ ref, Lipid, CMP and CBC     Latest RX: omeprazole 20 MG Oral Capsule Delayed Release 90 caps 3 refills on 5/1/20 wrong pharmacy     simvastatin 20 MG Oral Tab 90 t

## 2020-08-15 ENCOUNTER — HOSPITAL ENCOUNTER (OUTPATIENT)
Dept: BONE DENSITY | Age: 80
Discharge: HOME | End: 2020-08-15
Attending: INTERNAL MEDICINE
Payer: MEDICARE

## 2020-08-15 ENCOUNTER — APPOINTMENT (OUTPATIENT)
Dept: LAB | Age: 80
End: 2020-08-15
Attending: INTERNAL MEDICINE
Payer: MEDICARE

## 2020-08-15 ENCOUNTER — HOSPITAL ENCOUNTER (OUTPATIENT)
Dept: CT IMAGING | Age: 80
Discharge: HOME | End: 2020-08-15
Attending: INTERNAL MEDICINE
Payer: MEDICARE

## 2020-08-15 DIAGNOSIS — R91.8 LUNG NODULES: ICD-10-CM

## 2020-08-15 DIAGNOSIS — E78.00 PURE HYPERCHOLESTEROLEMIA: ICD-10-CM

## 2020-08-15 DIAGNOSIS — Z78.0 POST-MENOPAUSAL: ICD-10-CM

## 2020-08-15 LAB
ALBUMIN SERPL-MCNC: 3.7 G/DL (ref 3.4–5)
ALBUMIN/GLOB SERPL: 1 {RATIO} (ref 1–2)
ALP LIVER SERPL-CCNC: 105 U/L (ref 55–142)
ALT SERPL-CCNC: 19 U/L (ref 13–56)
ANION GAP SERPL CALC-SCNC: 4 MMOL/L (ref 0–18)
AST SERPL-CCNC: 24 U/L (ref 15–37)
BILIRUB SERPL-MCNC: 0.6 MG/DL (ref 0.1–2)
BUN BLD-MCNC: 15 MG/DL (ref 7–18)
BUN/CREAT SERPL: 17.2 (ref 10–20)
CALCIUM BLD-MCNC: 9.4 MG/DL (ref 8.5–10.1)
CHLORIDE SERPL-SCNC: 109 MMOL/L (ref 98–112)
CHOLEST SMN-MCNC: 188 MG/DL (ref ?–200)
CO2 SERPL-SCNC: 27 MMOL/L (ref 21–32)
CREAT BLD-MCNC: 0.87 MG/DL (ref 0.55–1.02)
GLOBULIN PLAS-MCNC: 3.6 G/DL (ref 2.8–4.4)
GLUCOSE BLD-MCNC: 84 MG/DL (ref 70–99)
HDLC SERPL-MCNC: 68 MG/DL (ref 40–59)
LDLC SERPL CALC-MCNC: 109 MG/DL (ref ?–100)
M PROTEIN MFR SERPL ELPH: 7.3 G/DL (ref 6.4–8.2)
NONHDLC SERPL-MCNC: 120 MG/DL (ref ?–130)
OSMOLALITY SERPL CALC.SUM OF ELEC: 290 MOSM/KG (ref 275–295)
PATIENT FASTING Y/N/NP: YES
PATIENT FASTING Y/N/NP: YES
POTASSIUM SERPL-SCNC: 4 MMOL/L (ref 3.5–5.1)
SODIUM SERPL-SCNC: 140 MMOL/L (ref 136–145)
TRIGL SERPL-MCNC: 53 MG/DL (ref 30–149)
VLDLC SERPL CALC-MCNC: 11 MG/DL (ref 0–30)

## 2020-08-15 PROCEDURE — 80053 COMPREHEN METABOLIC PANEL: CPT

## 2020-08-15 PROCEDURE — 77080 DXA BONE DENSITY AXIAL: CPT | Performed by: INTERNAL MEDICINE

## 2020-08-15 PROCEDURE — 36415 COLL VENOUS BLD VENIPUNCTURE: CPT

## 2020-08-15 PROCEDURE — 71250 CT THORAX DX C-: CPT | Performed by: INTERNAL MEDICINE

## 2020-08-15 PROCEDURE — 80061 LIPID PANEL: CPT

## 2020-08-17 NOTE — PROGRESS NOTES
All looks good. The nodules are stable and have not grown in the last year. We had biopsied this and non cancer. So this is old scarring. And stable. And no further follow up on this needed with routine Ct scans.   Message sent to patient via [address]

## 2020-08-19 NOTE — PROGRESS NOTES
Patient informed, verbalized understanding. Most questions answered. Advised pt to make an appointment to discuss further. Pt agreed. Appointment made for 9/4/2020 with Dr. Katrin Diaz.

## 2020-10-17 DIAGNOSIS — E78.49 OTHER HYPERLIPIDEMIA: ICD-10-CM

## 2020-10-20 RX ORDER — SIMVASTATIN 20 MG
TABLET ORAL
Qty: 90 TABLET | Refills: 0 | Status: SHIPPED | OUTPATIENT
Start: 2020-10-20 | End: 2021-01-07

## 2021-01-06 DIAGNOSIS — E78.49 OTHER HYPERLIPIDEMIA: ICD-10-CM

## 2021-01-07 RX ORDER — SIMVASTATIN 20 MG
TABLET ORAL
Qty: 90 TABLET | Refills: 3 | Status: SHIPPED | OUTPATIENT
Start: 2021-01-07 | End: 2021-11-16

## 2021-01-07 NOTE — TELEPHONE ENCOUNTER
Last visit-  02/15/2020    Last refill-  10/20/2020 simvastatin 20mg QTY90 0R    Last labs-  08/15/2020 cmp, lipid  Future Appointments   Date Time Provider eClsa Griffin   3/29/2021  2:00 PM Tanisha Wheeler MD [address] [place] [address]       Per Protocol- Passed

## 2021-01-26 ENCOUNTER — PATIENT MESSAGE (OUTPATIENT)
Dept: INTERNAL MEDICINE CLINIC | Facility: CLINIC | Age: 81
End: 2021-01-26

## 2021-02-24 ENCOUNTER — TELEPHONE (OUTPATIENT)
Dept: INTERNAL MEDICINE CLINIC | Facility: CLINIC | Age: 81
End: 2021-02-24

## 2021-02-24 DIAGNOSIS — Z12.31 ENCOUNTER FOR SCREENING MAMMOGRAM FOR MALIGNANT NEOPLASM OF BREAST: ICD-10-CM

## 2021-02-24 DIAGNOSIS — E78.00 PURE HYPERCHOLESTEROLEMIA: ICD-10-CM

## 2021-02-24 DIAGNOSIS — Z00.00 ROUTINE GENERAL MEDICAL EXAMINATION AT A HEALTH CARE FACILITY: Primary | ICD-10-CM

## 2021-02-24 DIAGNOSIS — K21.00 GASTROESOPHAGEAL REFLUX DISEASE WITH ESOPHAGITIS, UNSPECIFIED WHETHER HEMORRHAGE: ICD-10-CM

## 2021-02-24 DIAGNOSIS — R92.2 DENSE BREAST: ICD-10-CM

## 2021-02-24 NOTE — TELEPHONE ENCOUNTER
Pt scheduled for Supervisit with Dr. Milly Mata.     Future Appointments   Date Time Provider Celsa Gaby   3/25/2021  2:20 PM Eder Prabhakar MD [facility]   3/29/2021  2:00 PM Maylin Lanier MD [address] [place] [address]     Pt is due to have her covid

## 2021-02-24 NOTE — TELEPHONE ENCOUNTER
Placed fasting lab orders per protocol for [facility]. Please advise or order mammo per pt request. Pt is having COVID shot 3/12/21 so if you do not advise doing both we can call to notify patient.

## 2021-02-25 NOTE — TELEPHONE ENCOUNTER
Omar Bhandari MD  [facility]                     Please let pt know that labs were normal except:     1) glucose up at 145, but HgA1c was 5.1. Is he willing to stop his insulin and take metformin 500mg 1/2 po bid? Work on diet and exercise. 2) albumin low. Increase protein in diet. 3) LDL little up at 105 and needs to be 100 or less. Work on diet and exercise. 4) urine showed lots of WBC's and some bacteria. Is he having any f/c/ns, dysuria, hematuria, increase urgency/frequency? Can he come back to give urine ctx?              2 Pt Identifiers verified. Notified pt.'s wife of above result note. Denies symptoms listed in 4. Wife states it's probably because he had a boil that was draining. States able to come in tomorrow after 2pm for re collection of urine cx. mammo ordered for her

## 2021-03-25 ENCOUNTER — OFFICE VISIT (OUTPATIENT)
Dept: INTERNAL MEDICINE CLINIC | Facility: CLINIC | Age: 81
End: 2021-03-25
Payer: COMMERCIAL

## 2021-03-25 VITALS
HEART RATE: 76 BPM | HEIGHT: 61.81 IN | BODY MASS INDEX: 27.42 KG/M2 | SYSTOLIC BLOOD PRESSURE: 132 MMHG | DIASTOLIC BLOOD PRESSURE: 82 MMHG | WEIGHT: 149 LBS | TEMPERATURE: 98 F | RESPIRATION RATE: 16 BRPM

## 2021-03-25 DIAGNOSIS — M85.80 OSTEOPENIA, UNSPECIFIED LOCATION: ICD-10-CM

## 2021-03-25 DIAGNOSIS — B39.2 PULMONARY HISTOPLASMOSIS (HCC): ICD-10-CM

## 2021-03-25 DIAGNOSIS — B39.2 PULMONARY GRANULOMA OF HISTOPLASMOSIS (HCC): ICD-10-CM

## 2021-03-25 DIAGNOSIS — G47.33 OBSTRUCTIVE SLEEP APNEA (ADULT) (PEDIATRIC): ICD-10-CM

## 2021-03-25 DIAGNOSIS — E78.00 PURE HYPERCHOLESTEROLEMIA: ICD-10-CM

## 2021-03-25 DIAGNOSIS — Z00.00 ENCOUNTER FOR ANNUAL HEALTH EXAMINATION: Primary | ICD-10-CM

## 2021-03-25 DIAGNOSIS — K21.9 GASTROESOPHAGEAL REFLUX DISEASE, UNSPECIFIED WHETHER ESOPHAGITIS PRESENT: ICD-10-CM

## 2021-03-25 PROCEDURE — 99397 PER PM REEVAL EST PAT 65+ YR: CPT | Performed by: INTERNAL MEDICINE

## 2021-03-25 PROCEDURE — 96160 PT-FOCUSED HLTH RISK ASSMT: CPT | Performed by: INTERNAL MEDICINE

## 2021-03-25 PROCEDURE — 3079F DIAST BP 80-89 MM HG: CPT | Performed by: INTERNAL MEDICINE

## 2021-03-25 PROCEDURE — 3008F BODY MASS INDEX DOCD: CPT | Performed by: INTERNAL MEDICINE

## 2021-03-25 PROCEDURE — G0439 PPPS, SUBSEQ VISIT: HCPCS | Performed by: INTERNAL MEDICINE

## 2021-03-25 PROCEDURE — 3075F SYST BP GE 130 - 139MM HG: CPT | Performed by: INTERNAL MEDICINE

## 2021-03-25 NOTE — PROGRESS NOTES
HPI:   Jayden Gillette is a 79year old female who presents for a MA (Medicare Advantage) Supervisit (Once per calendar year). Patient is , doing well.  She had both covid 19 vaccines  HL- taking simvastatin, no SE reported  GERD- symptoms cont disease with esophagitis     CSA (central sleep apnea)     Glucose intolerance     Pulmonary histoplasmosis (HCC)     Central sleep apnea     Pulmonary granuloma of histoplasmosis (HCC)     Multinodular goiter (nontoxic)     OAB (overactive bladder)    Wt her mother; Cancer (age of onset: 68) in her father; Heart Attack in her paternal grandfather; Stroke in her maternal grandmother; TB in her paternal grandmother. SOCIAL HISTORY:   She  reports that she has never smoked.  She has never used smokeless toba Deferred   Extremities: Extremities normal, atraumatic, no cyanosis or edema   Pulses: 2+ and symmetric   Skin: Skin color, texture, turgor normal, no rashes or lesions   Lymph nodes: Cervical, supraclavicular, and axillary nodes normal   Neurologic: [place] agrees to the plan. Reinforced healthy diet, lifestyle, and exercise. Return in about 1 year (around 3/25/2022) for wellness.      Meta Opitz, MD, 3/25/2021     General Health     In the past six months, have you lost more than 10 pounds without tryi (CPT=77080) 08/15/2020   No flowsheet data found. Pap and Pelvic      Pap: Every 3 yrs age 21-68 or Pap+HPV every 5 yrs age 33-67, age 72 and older at high risk There are no preventive care reminders to display for this patient.  Update [facility]

## 2021-03-25 NOTE — PATIENT INSTRUCTIONS
Katherine Miles's SCREENING SCHEDULE   Tests on this list are recommended by your physician but may not be covered, or covered at this frequency, by your insurer. Please check with your insurance carrier before scheduling to verify coverage.    PEMA to Medicare Visit    Abdominal aortic aneurysm screening (once between ages 73-68) IPPE only No results found for this or any previous visit.  Limited to patients who meet one of the following criteria:   • Men who are 73-68 years old and have smoked more t Screening Mammogram      Mammogram    Recommend Annually to at least age 76, and as needed after 76 There are no preventive care reminders to display for this patient.  Please get this Mammogram regularly   Immunizations      Influenza  Covered Annually O [place]. This site has a lot of good information including definitions of the different types of Advance Directives.  It also has the State forms available on it's website for anyone to review and print using their home computer and p

## 2021-03-28 DIAGNOSIS — K21.00 GASTROESOPHAGEAL REFLUX DISEASE WITH ESOPHAGITIS: ICD-10-CM

## 2021-03-29 RX ORDER — OMEPRAZOLE 20 MG/1
CAPSULE, DELAYED RELEASE ORAL
Qty: 90 CAPSULE | Refills: 3 | Status: SHIPPED | OUTPATIENT
Start: 2021-03-29

## 2021-03-29 NOTE — TELEPHONE ENCOUNTER
Last Ov: 3/25/21, TB, CPE  Last labs: Lipid, CMP 8/15/20  Last Rx: omeprazole 20mg, #90, 3R 5/5/20    Future Appointments   Date Time Provider Celsa Griffin   4/12/2021  9:00 AM [facility]   3/30/2022  1:30 PM MD Mine Mariscal

## 2021-04-12 ENCOUNTER — HOSPITAL ENCOUNTER (OUTPATIENT)
Dept: MAMMOGRAPHY | Age: 81
Discharge: HOME | End: 2021-04-12
Attending: INTERNAL MEDICINE
Payer: MEDICARE

## 2021-04-12 DIAGNOSIS — Z12.31 ENCOUNTER FOR SCREENING MAMMOGRAM FOR MALIGNANT NEOPLASM OF BREAST: ICD-10-CM

## 2021-04-12 DIAGNOSIS — R92.2 DENSE BREAST: ICD-10-CM

## 2021-04-12 PROCEDURE — 77063 BREAST TOMOSYNTHESIS BI: CPT | Performed by: INTERNAL MEDICINE

## 2021-04-12 PROCEDURE — 77067 SCR MAMMO BI INCL CAD: CPT | Performed by: INTERNAL MEDICINE

## 2021-04-26 ENCOUNTER — LAB ENCOUNTER (OUTPATIENT)
Dept: LAB | Age: 81
End: 2021-04-26
Attending: INTERNAL MEDICINE
Payer: MEDICARE

## 2021-04-26 PROCEDURE — 85025 COMPLETE CBC W/AUTO DIFF WBC: CPT | Performed by: INTERNAL MEDICINE

## 2021-04-26 PROCEDURE — 80061 LIPID PANEL: CPT | Performed by: INTERNAL MEDICINE

## 2021-04-26 PROCEDURE — 36415 COLL VENOUS BLD VENIPUNCTURE: CPT | Performed by: INTERNAL MEDICINE

## 2021-04-26 PROCEDURE — 80053 COMPREHEN METABOLIC PANEL: CPT | Performed by: INTERNAL MEDICINE

## 2021-04-26 PROCEDURE — 84443 ASSAY THYROID STIM HORMONE: CPT | Performed by: INTERNAL MEDICINE

## 2021-06-15 ENCOUNTER — OFFICE VISIT (OUTPATIENT)
Dept: INTERNAL MEDICINE CLINIC | Facility: CLINIC | Age: 81
End: 2021-06-15
Payer: COMMERCIAL

## 2021-06-15 VITALS
WEIGHT: 146 LBS | RESPIRATION RATE: 16 BRPM | BODY MASS INDEX: 26.19 KG/M2 | HEIGHT: 62.5 IN | TEMPERATURE: 97 F | SYSTOLIC BLOOD PRESSURE: 122 MMHG | DIASTOLIC BLOOD PRESSURE: 76 MMHG | HEART RATE: 76 BPM

## 2021-06-15 DIAGNOSIS — K21.9 GASTROESOPHAGEAL REFLUX DISEASE, UNSPECIFIED WHETHER ESOPHAGITIS PRESENT: ICD-10-CM

## 2021-06-15 DIAGNOSIS — E78.00 PURE HYPERCHOLESTEROLEMIA: ICD-10-CM

## 2021-06-15 DIAGNOSIS — R30.0 DYSURIA: Primary | ICD-10-CM

## 2021-06-15 PROCEDURE — 99214 OFFICE O/P EST MOD 30 MIN: CPT | Performed by: INTERNAL MEDICINE

## 2021-06-15 PROCEDURE — 3078F DIAST BP <80 MM HG: CPT | Performed by: INTERNAL MEDICINE

## 2021-06-15 PROCEDURE — 3008F BODY MASS INDEX DOCD: CPT | Performed by: INTERNAL MEDICINE

## 2021-06-15 PROCEDURE — 3074F SYST BP LT 130 MM HG: CPT | Performed by: INTERNAL MEDICINE

## 2021-06-15 PROCEDURE — 81003 URINALYSIS AUTO W/O SCOPE: CPT | Performed by: INTERNAL MEDICINE

## 2021-06-15 RX ORDER — NITROFURANTOIN 25; 75 MG/1; MG/1
100 CAPSULE ORAL 2 TIMES DAILY
Qty: 10 CAPSULE | Refills: 0 | Status: SHIPPED | OUTPATIENT
Start: 2021-06-15 | End: 2022-01-19

## 2021-06-15 RX ORDER — PHENAZOPYRIDINE HYDROCHLORIDE 200 MG/1
200 TABLET, FILM COATED ORAL 3 TIMES DAILY PRN
Qty: 6 TABLET | Refills: 1 | Status: SHIPPED | OUTPATIENT
Start: 2021-06-15

## 2021-06-15 NOTE — PROGRESS NOTES
Cary Harrell is a 79year old female.   Patient presents with:  Urinary Symptoms: SN RM 3; 6/11, lower abd pain, frequency, irritation/pain, chills  Other: medication review      HPI:     Patient here for f/u-  3 days ago she had pain with urination, fr Diagnosis Date   • Blepharitis of both eyes 8/6/12- Dr NEIL Will   • Colon polyp 9/22/2015    Mid transverse polyp- tubular adenoma, no dysplasia or malignancy    • Esophageal reflux    • High cholesterol    • Irritable bowel syndrome     quiescent   • is on 3 days of macrobid  -discussed she should first do urine studies before starting ABX. Discussed options including urgent care or coming in to see me or my partners.  She can also message me so that I can order urine studies on her prior to ABX if need

## 2021-11-15 DIAGNOSIS — E78.49 OTHER HYPERLIPIDEMIA: ICD-10-CM

## 2021-11-16 RX ORDER — SIMVASTATIN 20 MG
TABLET ORAL
Qty: 90 TABLET | Refills: 1 | Status: SHIPPED | OUTPATIENT
Start: 2021-11-16

## 2022-01-19 ENCOUNTER — HOSPITAL ENCOUNTER (OUTPATIENT)
Dept: GENERAL RADIOLOGY | Age: 82
Discharge: HOME | End: 2022-01-19
Attending: NURSE PRACTITIONER
Payer: MEDICARE

## 2022-01-19 ENCOUNTER — OFFICE VISIT (OUTPATIENT)
Dept: INTERNAL MEDICINE CLINIC | Facility: CLINIC | Age: 82
End: 2022-01-19
Payer: COMMERCIAL

## 2022-01-19 VITALS
HEART RATE: 88 BPM | WEIGHT: 149 LBS | HEIGHT: 62.5 IN | BODY MASS INDEX: 26.74 KG/M2 | SYSTOLIC BLOOD PRESSURE: 114 MMHG | DIASTOLIC BLOOD PRESSURE: 66 MMHG | TEMPERATURE: 97 F

## 2022-01-19 DIAGNOSIS — Z01.89 ENCOUNTER FOR LOWER EXTREMITY COMPARISON IMAGING STUDY: ICD-10-CM

## 2022-01-19 DIAGNOSIS — M25.562 ACUTE PAIN OF LEFT KNEE: Primary | ICD-10-CM

## 2022-01-19 DIAGNOSIS — M25.562 ACUTE PAIN OF LEFT KNEE: ICD-10-CM

## 2022-01-19 PROCEDURE — 73562 X-RAY EXAM OF KNEE 3: CPT | Performed by: NURSE PRACTITIONER

## 2022-01-19 PROCEDURE — 3008F BODY MASS INDEX DOCD: CPT | Performed by: NURSE PRACTITIONER

## 2022-01-19 PROCEDURE — 3074F SYST BP LT 130 MM HG: CPT | Performed by: NURSE PRACTITIONER

## 2022-01-19 PROCEDURE — 99213 OFFICE O/P EST LOW 20 MIN: CPT | Performed by: NURSE PRACTITIONER

## 2022-01-19 PROCEDURE — 3078F DIAST BP <80 MM HG: CPT | Performed by: NURSE PRACTITIONER

## 2022-01-19 PROCEDURE — 73564 X-RAY EXAM KNEE 4 OR MORE: CPT | Performed by: NURSE PRACTITIONER

## 2022-01-19 RX ORDER — NAPROXEN 500 MG/1
500 TABLET ORAL 2 TIMES DAILY WITH MEALS
Qty: 20 TABLET | Refills: 0 | Status: SHIPPED | OUTPATIENT
Start: 2022-01-19

## 2022-01-19 NOTE — PROGRESS NOTES
Lalo Solorzano is a 79year old female. Patient presents with:  Knee Pain: AJ rm 10 left knee pain x1 week ago had fall on ice      HPI:   Here for eval of left knee pain. S/p fall on ice 1 week ago. No head trauma.   Since then with some discomfort un Carbonate-Vitamin D 500-125 MG-UNIT Oral Tab Take by mouth. Take one tablet daily        Past Medical History:   Diagnosis Date   • Blepharitis of both eyes 8/6/12- Dr NEIL Ferguson   • Colon polyp 9/22/2015    Mid transverse polyp- tubular adenoma, no dyspl encounter diagnosis)  Suspect strain. Naprosyn bid x 7-10 days with food. Check xray. Consider PT and/or ortho consult if not improving. She will call with update in 1-2 weeks.    Due for AWV with TB she would like to schedule on her way out today     N

## 2022-01-25 ENCOUNTER — TELEPHONE (OUTPATIENT)
Dept: INTERNAL MEDICINE CLINIC | Facility: CLINIC | Age: 82
End: 2022-01-25

## 2022-01-25 DIAGNOSIS — K21.9 GASTROESOPHAGEAL REFLUX DISEASE, UNSPECIFIED WHETHER ESOPHAGITIS PRESENT: ICD-10-CM

## 2022-01-25 DIAGNOSIS — E78.00 PURE HYPERCHOLESTEROLEMIA: Primary | ICD-10-CM

## 2022-01-25 NOTE — TELEPHONE ENCOUNTER
Orders to Mandy terrell must fast no call back required  Future Appointments   Date Time Provider Celsa Griffin   2/15/2022  9:20 AM Pauly Hirsch MD [facility]   3/30/2022  1:30 PM Angela Guzman MD [address] [place] [address]

## 2022-01-27 ENCOUNTER — TELEPHONE (OUTPATIENT)
Dept: INTERNAL MEDICINE CLINIC | Facility: CLINIC | Age: 82
End: 2022-01-27

## 2022-01-27 DIAGNOSIS — M25.562 ACUTE PAIN OF LEFT KNEE: Primary | ICD-10-CM
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
## 2022-01-27 NOTE — TELEPHONE ENCOUNTER
Pt was to call us back in a week to let us know how she is doing-She is feeling a little better after fall last week-still cannot put weight on the leg-flat surface is fine but going up and down stairs is a problem-bending is an issue as well-JACQUELINE

## 2022-01-27 NOTE — TELEPHONE ENCOUNTER
LOV 1/19/2022- JACQUELINE DONNELLY.   ASSESSMENT AND PLAN:   Acute pain of left knee (primary encounter diagnosis) Suspect strain. Naprosyn bid x 7-10 days with food. Check xray. Consider PT and/or ortho consult if not improving. She will call with update in 1-2 weeks.

## 2022-01-28 NOTE — TELEPHONE ENCOUNTER
Patient states that she feels much better today, has a friend in Physical Therapy who talked with her, has her walking better today. Pt notified SD ordered PT for her, given PT info. Pt verbalizes understanding.

## 2022-02-09 RX ORDER — OMEPRAZOLE 20 MG/1
CAPSULE, DELAYED RELEASE ORAL
Qty: 90 CAPSULE | Refills: 1 | Status: SHIPPED | OUTPATIENT
Start: 2022-02-09

## 2022-02-09 NOTE — TELEPHONE ENCOUNTER
Last OV 1/19/22  Last PE 3/25/21  Last REFILL   Medication Quantity Refills Start End   omeprazole 20 MG Oral Capsule Delayed Release 90 capsule 3 3/29/2021      Last LABS 4/26/21 tsh, lipid, cmp, cbc    Future Appointments   Date Time Provider Celsa Gaby   2/10/2022  9:00 AM REFERENCE [id number]   2/15/2022  8:30 AM Glynn Boucher, PT [facility] PHYS TH [place]   2/15/2022  9:20 AM Pauly Hirsch MD [facility]   2/17/2022  8:30 AM Glynn Radhas, PT [facility] PHYS TH [place]   2/22/2022  8:30 AM Glynn Emms, PT [facility] PHYS TH [place]   2/24/2022  8:30 AM Glynn Emms, PT [facility] PHYS [address]   3/1/2022  8:30 AM Glynn Emms, PT [facility] PHYS [address]   3/3/2022  8:30 AM Glynn Emms, PT [facility] PHYS [address]   3/8/2022  8:30 AM Glynn Emms, PT [facility] PHYS [address]   3/10/2022  8:30 AM Glynn Emms, PT [facility] PHYS [address]   3/30/2022  1:30 PM Angela Guzman MD [address] [place] [address]         Per PROTOCOL?   Not on protocol     Please Advise

## 2022-02-10 ENCOUNTER — LAB ENCOUNTER (OUTPATIENT)
Dept: LAB | Age: 82
End: 2022-02-10
Attending: INTERNAL MEDICINE
Payer: MEDICARE

## 2022-02-10 DIAGNOSIS — K21.9 GASTROESOPHAGEAL REFLUX DISEASE, UNSPECIFIED WHETHER ESOPHAGITIS PRESENT: ICD-10-CM

## 2022-02-10 DIAGNOSIS — E78.00 PURE HYPERCHOLESTEROLEMIA: ICD-10-CM

## 2022-02-10 LAB
ALBUMIN SERPL-MCNC: 3.7 G/DL (ref 3.4–5)
ALBUMIN/GLOB SERPL: 1.2 {RATIO} (ref 1–2)
ALP LIVER SERPL-CCNC: 107 U/L
ALT SERPL-CCNC: 15 U/L
ANION GAP SERPL CALC-SCNC: 6 MMOL/L (ref 0–18)
AST SERPL-CCNC: 20 U/L (ref 15–37)
BASOPHILS # BLD AUTO: 0.04 X10(3) UL (ref 0–0.2)
BASOPHILS NFR BLD AUTO: 0.5 %
BILIRUB SERPL-MCNC: 0.7 MG/DL (ref 0.1–2)
BUN BLD-MCNC: 14 MG/DL (ref 7–18)
CALCIUM BLD-MCNC: 8.9 MG/DL (ref 8.5–10.1)
CHLORIDE SERPL-SCNC: 106 MMOL/L (ref 98–112)
CHOLEST SERPL-MCNC: 168 MG/DL (ref ?–200)
CO2 SERPL-SCNC: 23 MMOL/L (ref 21–32)
CREAT BLD-MCNC: 0.82 MG/DL
EOSINOPHIL # BLD AUTO: 0.08 X10(3) UL (ref 0–0.7)
EOSINOPHIL NFR BLD AUTO: 0.9 %
ERYTHROCYTE [DISTWIDTH] IN BLOOD BY AUTOMATED COUNT: 13.1 %
FASTING PATIENT LIPID ANSWER: YES
FASTING STATUS PATIENT QL REPORTED: YES
GLOBULIN PLAS-MCNC: 3.1 G/DL (ref 2.8–4.4)
GLUCOSE BLD-MCNC: 97 MG/DL (ref 70–99)
HCT VFR BLD AUTO: 42.6 %
HDLC SERPL-MCNC: 70 MG/DL (ref 40–59)
HGB BLD-MCNC: 13.6 G/DL
IMM GRANULOCYTES # BLD AUTO: 0.01 X10(3) UL (ref 0–1)
IMM GRANULOCYTES NFR BLD: 0.1 %
LDLC SERPL CALC-MCNC: 84 MG/DL (ref ?–100)
LYMPHOCYTES # BLD AUTO: 3.34 X10(3) UL (ref 1–4)
LYMPHOCYTES NFR BLD AUTO: 39 %
MCH RBC QN AUTO: 28.7 PG (ref 26–34)
MCHC RBC AUTO-ENTMCNC: 31.9 G/DL (ref 31–37)
MCV RBC AUTO: 89.9 FL
MONOCYTES # BLD AUTO: 0.8 X10(3) UL (ref 0.1–1)
MONOCYTES NFR BLD AUTO: 9.3 %
NEUTROPHILS # BLD AUTO: 4.29 X10 (3) UL (ref 1.5–7.7)
NEUTROPHILS # BLD AUTO: 4.29 X10(3) UL (ref 1.5–7.7)
NEUTROPHILS NFR BLD AUTO: 50.2 %
NONHDLC SERPL-MCNC: 98 MG/DL (ref ?–130)
OSMOLALITY SERPL CALC.SUM OF ELEC: 280 MOSM/KG (ref 275–295)
PLATELET # BLD AUTO: 203 10(3)UL (ref 150–450)
POTASSIUM SERPL-SCNC: 4.2 MMOL/L (ref 3.5–5.1)
PROT SERPL-MCNC: 6.8 G/DL (ref 6.4–8.2)
RBC # BLD AUTO: 4.74 X10(6)UL
SODIUM SERPL-SCNC: 135 MMOL/L (ref 136–145)
TRIGL SERPL-MCNC: 71 MG/DL (ref 30–149)
TSI SER-ACNC: 2.12 MIU/ML (ref 0.36–3.74)
VLDLC SERPL CALC-MCNC: 11 MG/DL (ref 0–30)
WBC # BLD AUTO: 8.6 X10(3) UL (ref 4–11)

## 2022-02-10 PROCEDURE — 80053 COMPREHEN METABOLIC PANEL: CPT

## 2022-02-10 PROCEDURE — 85025 COMPLETE CBC W/AUTO DIFF WBC: CPT

## 2022-02-10 PROCEDURE — 36415 COLL VENOUS BLD VENIPUNCTURE: CPT

## 2022-02-10 PROCEDURE — 84443 ASSAY THYROID STIM HORMONE: CPT

## 2022-02-10 PROCEDURE — 80061 LIPID PANEL: CPT

## 2022-02-11 ENCOUNTER — TELEPHONE (OUTPATIENT)
Dept: PHYSICAL THERAPY | Facility: HOSPITAL | Age: 82
End: 2022-02-11

## 2022-02-15 ENCOUNTER — OFFICE VISIT (OUTPATIENT)
Dept: PHYSICAL THERAPY | Facility: HOSPITAL | Age: 82
End: 2022-02-15
Attending: NURSE PRACTITIONER
Payer: MEDICARE

## 2022-02-15 ENCOUNTER — TELEPHONE (OUTPATIENT)
Dept: INTERNAL MEDICINE CLINIC | Facility: CLINIC | Age: 82
End: 2022-02-15

## 2022-02-15 ENCOUNTER — OFFICE VISIT (OUTPATIENT)
Dept: INTERNAL MEDICINE CLINIC | Facility: CLINIC | Age: 82
End: 2022-02-15
Payer: COMMERCIAL

## 2022-02-15 VITALS
RESPIRATION RATE: 16 BRPM | HEIGHT: 62.5 IN | WEIGHT: 150 LBS | BODY MASS INDEX: 26.91 KG/M2 | SYSTOLIC BLOOD PRESSURE: 120 MMHG | TEMPERATURE: 98 F | HEART RATE: 63 BPM | OXYGEN SATURATION: 98 % | DIASTOLIC BLOOD PRESSURE: 70 MMHG

## 2022-02-15 DIAGNOSIS — Z00.00 ENCOUNTER FOR ANNUAL HEALTH EXAMINATION: Primary | ICD-10-CM

## 2022-02-15 DIAGNOSIS — B39.2 PULMONARY HISTOPLASMOSIS (HCC): ICD-10-CM

## 2022-02-15 DIAGNOSIS — B39.2 PULMONARY GRANULOMA OF HISTOPLASMOSIS (HCC): ICD-10-CM

## 2022-02-15 DIAGNOSIS — M25.562 ACUTE PAIN OF LEFT KNEE: ICD-10-CM

## 2022-02-15 DIAGNOSIS — R30.0 DYSURIA: ICD-10-CM

## 2022-02-15 DIAGNOSIS — Z12.31 ENCOUNTER FOR SCREENING MAMMOGRAM FOR MALIGNANT NEOPLASM OF BREAST: ICD-10-CM

## 2022-02-15 DIAGNOSIS — G47.33 OBSTRUCTIVE SLEEP APNEA (ADULT) (PEDIATRIC): ICD-10-CM

## 2022-02-15 DIAGNOSIS — K21.9 GASTROESOPHAGEAL REFLUX DISEASE, UNSPECIFIED WHETHER ESOPHAGITIS PRESENT: ICD-10-CM

## 2022-02-15 DIAGNOSIS — Z78.0 POST-MENOPAUSAL: ICD-10-CM

## 2022-02-15 DIAGNOSIS — E78.00 PURE HYPERCHOLESTEROLEMIA: ICD-10-CM

## 2022-02-15 DIAGNOSIS — M85.89 OSTEOPENIA OF MULTIPLE SITES: ICD-10-CM

## 2022-02-15 PROBLEM — G47.31 CSA (CENTRAL SLEEP APNEA): Status: RESOLVED | Noted: 2017-12-12 | Resolved: 2022-02-15

## 2022-02-15 PROBLEM — G47.31 CENTRAL SLEEP APNEA: Status: RESOLVED | Noted: 2019-09-09 | Resolved: 2022-02-15

## 2022-02-15 LAB
APPEARANCE: CLEAR
BILIRUBIN: NEGATIVE
GLUCOSE (URINE DIPSTICK): NEGATIVE MG/DL
KETONES (URINE DIPSTICK): NEGATIVE MG/DL
MULTISTIX LOT#: ABNORMAL NUMERIC
NITRITE, URINE: NEGATIVE
PH, URINE: 5.5 (ref 4.5–8)
PROTEIN (URINE DIPSTICK): NEGATIVE MG/DL
SPECIFIC GRAVITY: 1.01 (ref 1–1.03)
URINE-COLOR: YELLOW
UROBILINOGEN,SEMI-QN: 0.2 MG/DL (ref 0–1.9)

## 2022-02-15 PROCEDURE — 97161 PT EVAL LOW COMPLEX 20 MIN: CPT

## 2022-02-15 PROCEDURE — 3078F DIAST BP <80 MM HG: CPT | Performed by: INTERNAL MEDICINE

## 2022-02-15 PROCEDURE — 87088 URINE BACTERIA CULTURE: CPT | Performed by: INTERNAL MEDICINE

## 2022-02-15 PROCEDURE — G0439 PPPS, SUBSEQ VISIT: HCPCS | Performed by: INTERNAL MEDICINE

## 2022-02-15 PROCEDURE — 97110 THERAPEUTIC EXERCISES: CPT

## 2022-02-15 PROCEDURE — 3008F BODY MASS INDEX DOCD: CPT | Performed by: INTERNAL MEDICINE

## 2022-02-15 PROCEDURE — 99397 PER PM REEVAL EST PAT 65+ YR: CPT | Performed by: INTERNAL MEDICINE

## 2022-02-15 PROCEDURE — 3074F SYST BP LT 130 MM HG: CPT | Performed by: INTERNAL MEDICINE

## 2022-02-15 PROCEDURE — 81003 URINALYSIS AUTO W/O SCOPE: CPT | Performed by: INTERNAL MEDICINE

## 2022-02-15 PROCEDURE — 87186 SC STD MICRODIL/AGAR DIL: CPT | Performed by: INTERNAL MEDICINE

## 2022-02-15 PROCEDURE — 87086 URINE CULTURE/COLONY COUNT: CPT | Performed by: INTERNAL MEDICINE

## 2022-02-15 PROCEDURE — 96160 PT-FOCUSED HLTH RISK ASSMT: CPT | Performed by: INTERNAL MEDICINE

## 2022-02-15 RX ORDER — SIMVASTATIN 20 MG
20 TABLET ORAL NIGHTLY
Qty: 90 TABLET | Refills: 3 | Status: SHIPPED | OUTPATIENT
Start: 2022-02-15

## 2022-02-15 RX ORDER — ALENDRONATE SODIUM 35 MG/1
35 TABLET ORAL
Qty: 12 TABLET | Refills: 3 | Status: SHIPPED | OUTPATIENT
Start: 2022-02-15

## 2022-02-15 RX ORDER — NITROFURANTOIN 25; 75 MG/1; MG/1
100 CAPSULE ORAL 2 TIMES DAILY
Qty: 10 CAPSULE | Refills: 0 | Status: SHIPPED | OUTPATIENT
Start: 2022-02-15 | End: 2022-02-18

## 2022-02-15 RX ORDER — PHENAZOPYRIDINE HYDROCHLORIDE 200 MG/1
200 TABLET, FILM COATED ORAL 3 TIMES DAILY PRN
Qty: 6 TABLET | Refills: 0 | Status: SHIPPED | OUTPATIENT
Start: 2022-02-15

## 2022-02-15 NOTE — TELEPHONE ENCOUNTER
Pt saw TB today for uti and she stated that TB always give her rx for pain that turns your urine orange- phenazopyridine but she did not send that in today-pt is asking her to send in now so she can go get it as the pain is getting worse-send to local [facility]

## 2022-02-17 ENCOUNTER — APPOINTMENT (OUTPATIENT)
Dept: PHYSICAL THERAPY | Facility: HOSPITAL | Age: 82
End: 2022-02-17
Attending: NURSE PRACTITIONER
Payer: MEDICARE

## 2022-02-18 RX ORDER — NITROFURANTOIN 25; 75 MG/1; MG/1
100 CAPSULE ORAL 2 TIMES DAILY
Qty: 10 CAPSULE | Refills: 0 | Status: SHIPPED | OUTPATIENT
Start: 2022-02-18

## 2022-02-18 NOTE — TELEPHONE ENCOUNTER
Pt has 3 more pills of the abx that was given to her for her UTI.  Her symptoms have only mildly improved and asking if maybe she needs more meds     Please advise

## 2022-02-18 NOTE — TELEPHONE ENCOUNTER
Patient states she has been taking the Macrobid BID #10 and has 3 pills left which will be enough for tonight and tomorrow. Pt states her s/s are the same as when she came in for her ov  But indicates the s/s are 70% better, still burning with urination and frequency, has increased water intake and cranberry juice, no new s/s. Pt asking for more Macrobid.

## 2022-02-18 NOTE — TELEPHONE ENCOUNTER
It is the right ABX choice but I will send in a refill if she feels symptoms and wants to extend for 10 days

## 2022-02-18 NOTE — TELEPHONE ENCOUNTER
Patient notified Elvira Ceballos is the right abx choice but will send a refill script if after finished with the current abx script that her s/s have not resolved then the refill script will be at her pharmacy for her. Pt verbalizes understanding.

## 2022-02-22 ENCOUNTER — APPOINTMENT (OUTPATIENT)
Dept: PHYSICAL THERAPY | Facility: HOSPITAL | Age: 82
End: 2022-02-22
Attending: NURSE PRACTITIONER
Payer: MEDICARE

## 2022-02-24 ENCOUNTER — TELEPHONE (OUTPATIENT)
Dept: PHYSICAL THERAPY | Facility: HOSPITAL | Age: 82
End: 2022-02-24

## 2022-02-24 ENCOUNTER — APPOINTMENT (OUTPATIENT)
Dept: PHYSICAL THERAPY | Facility: HOSPITAL | Age: 82
End: 2022-02-24
Attending: NURSE PRACTITIONER
Payer: MEDICARE

## 2022-03-01 ENCOUNTER — APPOINTMENT (OUTPATIENT)
Dept: PHYSICAL THERAPY | Facility: HOSPITAL | Age: 82
End: 2022-03-01
Attending: NURSE PRACTITIONER
Payer: MEDICARE

## 2022-03-03 ENCOUNTER — OFFICE VISIT (OUTPATIENT)
Dept: PHYSICAL THERAPY | Facility: HOSPITAL | Age: 82
End: 2022-03-03
Attending: NURSE PRACTITIONER
Payer: MEDICARE

## 2022-03-03 PROCEDURE — 97164 PT RE-EVAL EST PLAN CARE: CPT

## 2022-03-03 NOTE — PROGRESS NOTES
Dx:     Acute pain of left knee (M25.562)  Fall, subsequent encounter (W19.XXXD)     Insurance (Authorized # of Visits):  6         Authorizing Physician: Dr. Donte Hoover  Next MD visit: none scheduled  Fall Risk: standard         Precautions: n/a             Subjective: Patient reports no pain in her left knee or discomfort when completing the stairs. She has no functional limitations at this time. Pain: 0/10      Objective:     Strength/MMT: (* denotes performed with pain)  Hip Knee Foot/Ankle   Flexion: R 4+/5; L 4+/5  Extension: NT  Abduction: R 5/5; L 5/5  ER: R 5/5; L /5  IR: R 5/5; L 5/5 Flexion: R 5/5; L 5/5  Extension: R 5/5; L 5/5    DF: R 5/5; L 5/5  PF: R 5/5; L 5/5            Gait: Pt ambulates on level ground with normal mechanics , no use of AD. Gait Speed: 1.84 m/s  ( Females aged 80-80: 0.94 m/s)    Steps: Patient voices no difficulty with task nor reproduction of knee symptoms. Balance: SLS: R 30 sec, L 15 sec    AROM: (* denotes performed with pain)  Knee   Flexion: R 126 degrees; L 120 degrees  Extension: R 0 degrees; L 0 degree         Assessment: Patient comes to her follow-up session without any reports of pain or functional limitations. She was reassessed with the only impairment noted as her SLS time. She was given a SLS balance exercise to add to her HEP. Her gait speed lies within the age-related norms and patient is not at increased fall risk. Patient will be formally discharged from skilled therapy services. Goals:  (to be met in 2 visits)  1. Patient will remain compliant with HEP in order to improve functional mobility and decrease symptom report. MET  2. Patient will ascend/descend 11 stairs with little to no symptom complaint in order to navigate home environment for ADL's. MET  3. Patient will voice self management strategies for pain/discomfort independently such as ice, heat, HEP, etc. MET  4.  Patient will improve SLS balance on L LE to at least 30 seconds in order to improve stability towards gait and stairs.  PARTIALLY MET        Plan: patient D/C 3/3/22  Today's treatment:   Objective tests & measures x15 min  HEP/goals review x3 min    Charges: Re-eval x1      Total Timed Treatment: 3 min  Total Treatment Time: 18 min

## 2022-03-08 ENCOUNTER — APPOINTMENT (OUTPATIENT)
Dept: PHYSICAL THERAPY | Facility: HOSPITAL | Age: 82
End: 2022-03-08
Attending: NURSE PRACTITIONER
Payer: MEDICARE

## 2022-03-10 ENCOUNTER — APPOINTMENT (OUTPATIENT)
Dept: PHYSICAL THERAPY | Facility: HOSPITAL | Age: 82
End: 2022-03-10
Attending: NURSE PRACTITIONER
Payer: MEDICARE

## 2022-04-01 ENCOUNTER — TELEPHONE (OUTPATIENT)
Dept: INTERNAL MEDICINE CLINIC | Facility: CLINIC | Age: 82
End: 2022-04-01

## 2022-04-01 NOTE — TELEPHONE ENCOUNTER
Pt scheduled her mammogram and needs order placed.       Future Appointments   Date Time Provider Celsa Griffin   4/13/2022  9:20 AM ANCELMO Chambers

## 2022-04-13 ENCOUNTER — HOSPITAL ENCOUNTER (OUTPATIENT)
Dept: MAMMOGRAPHY | Age: 82
Discharge: HOME | End: 2022-04-13
Attending: INTERNAL MEDICINE
Payer: MEDICARE

## 2022-04-13 DIAGNOSIS — Z12.31 ENCOUNTER FOR SCREENING MAMMOGRAM FOR MALIGNANT NEOPLASM OF BREAST: ICD-10-CM

## 2022-04-13 PROCEDURE — 77063 BREAST TOMOSYNTHESIS BI: CPT | Performed by: INTERNAL MEDICINE

## 2022-04-13 PROCEDURE — 77067 SCR MAMMO BI INCL CAD: CPT | Performed by: INTERNAL MEDICINE

## 2022-07-25 DIAGNOSIS — K21.00 GASTROESOPHAGEAL REFLUX DISEASE WITH ESOPHAGITIS: ICD-10-CM

## 2022-07-27 RX ORDER — OMEPRAZOLE 20 MG/1
CAPSULE, DELAYED RELEASE ORAL
Qty: 90 CAPSULE | Refills: 0 | Status: SHIPPED | OUTPATIENT
Start: 2022-07-27

## 2022-07-27 NOTE — TELEPHONE ENCOUNTER
Last OV: 2/15/22 annual PE    No future appointments. Latest labs: 2/10/22 TSH, CMp, CBC and Lipid    Latest RX: Omeprazole 20 MG Oral Capsule Delayed Release 90 caps 1 refill on 2/9/22    Per protocol, not on protocol. Rx pending.

## 2022-10-18 DIAGNOSIS — K21.00 GASTROESOPHAGEAL REFLUX DISEASE WITH ESOPHAGITIS: ICD-10-CM

## 2022-10-19 RX ORDER — OMEPRAZOLE 20 MG/1
CAPSULE, DELAYED RELEASE ORAL
Qty: 90 CAPSULE | Refills: 3 | Status: SHIPPED | OUTPATIENT
Start: 2022-10-19

## 2022-12-22 ENCOUNTER — TELEPHONE (OUTPATIENT)
Dept: INTERNAL MEDICINE CLINIC | Facility: CLINIC | Age: 82
End: 2022-12-22

## 2022-12-22 NOTE — TELEPHONE ENCOUNTER
Patient states more frequent bowel movements in the week but does report eating more + 3 lbs. No edema. States poor diet r/t holidays. No blood in stool, abdominal pain, bloating. Does report mild increase in gas. Also over the last 2-3 weeks noticed mild RODRIGUES when going up the stairs. no other concerns, changes or symptoms present. Speaking in full sentences, clear speech. No discomfort or pain at this time. Scheduled 2/27/2022 in office for evaluation. ER warnings given. No recent health changes or concerns since seen in office 2/2022.

## 2022-12-22 NOTE — TELEPHONE ENCOUNTER
Patient states that about 3 weeks ago he has had changes in her bowel movements. Instead of bowel movements being about 1x per week patient is having a bowel movement 1x daily. No constipation or diarrhea. Patient states that she was to call TB if there were any changes in her bowel habits. Patient had originally called to schedule her Supervisit and 1st available for a physical is March.

## 2022-12-23 NOTE — TELEPHONE ENCOUNTER
Future Appointments   Date Time Provider Celsa Griffin   12/27/2022 11:20 AM Korey Luevano.KARI [facility]     Noted.

## 2022-12-27 ENCOUNTER — OFFICE VISIT (OUTPATIENT)
Dept: INTERNAL MEDICINE CLINIC | Facility: CLINIC | Age: 82
End: 2022-12-27
Payer: COMMERCIAL

## 2022-12-27 ENCOUNTER — LAB ENCOUNTER (OUTPATIENT)
Dept: LAB | Age: 82
End: 2022-12-27
Attending: PHYSICIAN ASSISTANT
Payer: MEDICARE

## 2022-12-27 ENCOUNTER — HOSPITAL ENCOUNTER (OUTPATIENT)
Dept: GENERAL RADIOLOGY | Age: 82
Discharge: HOME | End: 2022-12-27
Attending: PHYSICIAN ASSISTANT
Payer: MEDICARE

## 2022-12-27 VITALS
WEIGHT: 158 LBS | TEMPERATURE: 97 F | HEIGHT: 62.5 IN | BODY MASS INDEX: 28.35 KG/M2 | HEART RATE: 62 BPM | DIASTOLIC BLOOD PRESSURE: 78 MMHG | SYSTOLIC BLOOD PRESSURE: 124 MMHG

## 2022-12-27 DIAGNOSIS — R06.09 DOE (DYSPNEA ON EXERTION): ICD-10-CM

## 2022-12-27 DIAGNOSIS — R19.4 CHANGE IN BOWEL HABITS: ICD-10-CM

## 2022-12-27 DIAGNOSIS — R06.09 DOE (DYSPNEA ON EXERTION): Primary | ICD-10-CM

## 2022-12-27 LAB
ALBUMIN SERPL-MCNC: 3.8 G/DL (ref 3.4–5)
ALBUMIN/GLOB SERPL: 1.2 {RATIO} (ref 1–2)
ALP LIVER SERPL-CCNC: 130 U/L
ALT SERPL-CCNC: 19 U/L
ANION GAP SERPL CALC-SCNC: 8 MMOL/L (ref 0–18)
AST SERPL-CCNC: 27 U/L (ref 15–37)
BASOPHILS # BLD AUTO: 0.04 X10(3) UL (ref 0–0.2)
BASOPHILS NFR BLD AUTO: 0.6 %
BILIRUB SERPL-MCNC: 0.5 MG/DL (ref 0.1–2)
BUN BLD-MCNC: 20 MG/DL (ref 7–18)
CALCIUM BLD-MCNC: 9.7 MG/DL (ref 8.5–10.1)
CHLORIDE SERPL-SCNC: 109 MMOL/L (ref 98–112)
CO2 SERPL-SCNC: 26 MMOL/L (ref 21–32)
CREAT BLD-MCNC: 0.92 MG/DL
EOSINOPHIL # BLD AUTO: 0.06 X10(3) UL (ref 0–0.7)
EOSINOPHIL NFR BLD AUTO: 0.9 %
ERYTHROCYTE [DISTWIDTH] IN BLOOD BY AUTOMATED COUNT: 13.7 %
FASTING STATUS PATIENT QL REPORTED: NO
GFR SERPLBLD BASED ON 1.73 SQ M-ARVRAT: 62 ML/MIN/1.73M2 (ref 60–?)
GLOBULIN PLAS-MCNC: 3.2 G/DL (ref 2.8–4.4)
GLUCOSE BLD-MCNC: 91 MG/DL (ref 70–99)
HCT VFR BLD AUTO: 43.2 %
HGB BLD-MCNC: 13.9 G/DL
IMM GRANULOCYTES # BLD AUTO: 0.02 X10(3) UL (ref 0–1)
IMM GRANULOCYTES NFR BLD: 0.3 %
LYMPHOCYTES # BLD AUTO: 2.15 X10(3) UL (ref 1–4)
LYMPHOCYTES NFR BLD AUTO: 31.4 %
MCH RBC QN AUTO: 29.8 PG (ref 26–34)
MCHC RBC AUTO-ENTMCNC: 32.2 G/DL (ref 31–37)
MCV RBC AUTO: 92.7 FL
MONOCYTES # BLD AUTO: 0.59 X10(3) UL (ref 0.1–1)
MONOCYTES NFR BLD AUTO: 8.6 %
NEUTROPHILS # BLD AUTO: 3.99 X10 (3) UL (ref 1.5–7.7)
NEUTROPHILS # BLD AUTO: 3.99 X10(3) UL (ref 1.5–7.7)
NEUTROPHILS NFR BLD AUTO: 58.2 %
NT-PROBNP SERPL-MCNC: 179 PG/ML (ref ?–450)
OSMOLALITY SERPL CALC.SUM OF ELEC: 298 MOSM/KG (ref 275–295)
PLATELET # BLD AUTO: 185 10(3)UL (ref 150–450)
POTASSIUM SERPL-SCNC: 5 MMOL/L (ref 3.5–5.1)
PROT SERPL-MCNC: 7 G/DL (ref 6.4–8.2)
RBC # BLD AUTO: 4.66 X10(6)UL
SODIUM SERPL-SCNC: 143 MMOL/L (ref 136–145)
TSI SER-ACNC: 2.07 MIU/ML (ref 0.36–3.74)
WBC # BLD AUTO: 6.9 X10(3) UL (ref 4–11)

## 2022-12-27 PROCEDURE — 3078F DIAST BP <80 MM HG: CPT | Performed by: PHYSICIAN ASSISTANT

## 2022-12-27 PROCEDURE — 99214 OFFICE O/P EST MOD 30 MIN: CPT | Performed by: PHYSICIAN ASSISTANT

## 2022-12-27 PROCEDURE — 3008F BODY MASS INDEX DOCD: CPT | Performed by: PHYSICIAN ASSISTANT

## 2022-12-27 PROCEDURE — 83880 ASSAY OF NATRIURETIC PEPTIDE: CPT

## 2022-12-27 PROCEDURE — 85025 COMPLETE CBC W/AUTO DIFF WBC: CPT

## 2022-12-27 PROCEDURE — 84443 ASSAY THYROID STIM HORMONE: CPT

## 2022-12-27 PROCEDURE — 80053 COMPREHEN METABOLIC PANEL: CPT

## 2022-12-27 PROCEDURE — 71046 X-RAY EXAM CHEST 2 VIEWS: CPT | Performed by: PHYSICIAN ASSISTANT

## 2022-12-27 PROCEDURE — 36415 COLL VENOUS BLD VENIPUNCTURE: CPT

## 2022-12-27 PROCEDURE — 3074F SYST BP LT 130 MM HG: CPT | Performed by: PHYSICIAN ASSISTANT

## 2022-12-27 PROCEDURE — 93000 ELECTROCARDIOGRAM COMPLETE: CPT | Performed by: PHYSICIAN ASSISTANT

## 2023-01-06 ENCOUNTER — HOSPITAL ENCOUNTER (OUTPATIENT)
Dept: CV DIAGNOSTICS | Facility: HOSPITAL | Age: 83
Discharge: HOME | End: 2023-01-06
Attending: PHYSICIAN ASSISTANT
Payer: MEDICARE

## 2023-01-06 DIAGNOSIS — R06.09 DOE (DYSPNEA ON EXERTION): ICD-10-CM

## 2023-01-06 PROCEDURE — 78452 HT MUSCLE IMAGE SPECT MULT: CPT | Performed by: PHYSICIAN ASSISTANT

## 2023-01-06 PROCEDURE — 93017 CV STRESS TEST TRACING ONLY: CPT | Performed by: PHYSICIAN ASSISTANT

## 2023-01-06 PROCEDURE — 93018 CV STRESS TEST I&R ONLY: CPT | Performed by: PHYSICIAN ASSISTANT

## 2023-01-09 DIAGNOSIS — R06.09 DOE (DYSPNEA ON EXERTION): Primary | ICD-10-CM

## 2023-01-12 DIAGNOSIS — E78.00 PURE HYPERCHOLESTEROLEMIA: ICD-10-CM

## 2023-01-13 RX ORDER — SIMVASTATIN 20 MG
TABLET ORAL
Qty: 90 TABLET | Refills: 1 | Status: SHIPPED | OUTPATIENT
Start: 2023-01-13

## 2023-01-24 ENCOUNTER — TELEPHONE (OUTPATIENT)
Dept: INTERNAL MEDICINE CLINIC | Facility: CLINIC | Age: 83
End: 2023-01-24

## 2023-01-24 NOTE — TELEPHONE ENCOUNTER
Supervisit   Future Appointments   Date Time Provider Celsa Griffin   2/21/2023  1:00 PM Shayla Olivares MD [facility]      Informed must fast. Pt stated she just had labs done/ call back required.  Does she need to do anything additional? Orders to    THE [facility]

## 2023-02-01 ENCOUNTER — TELEPHONE (OUTPATIENT)
Dept: INTERNAL MEDICINE CLINIC | Facility: CLINIC | Age: 83
End: 2023-02-01

## 2023-02-15 DIAGNOSIS — Z00.00 ENCOUNTER FOR ANNUAL HEALTH EXAMINATION: ICD-10-CM

## 2023-02-15 DIAGNOSIS — E78.00 PURE HYPERCHOLESTEROLEMIA: Primary | ICD-10-CM

## 2023-02-16 ENCOUNTER — LAB ENCOUNTER (OUTPATIENT)
Dept: LAB | Age: 83
End: 2023-02-16
Attending: INTERNAL MEDICINE
Payer: MEDICARE

## 2023-02-16 DIAGNOSIS — E78.00 PURE HYPERCHOLESTEROLEMIA: ICD-10-CM

## 2023-02-16 DIAGNOSIS — Z00.00 ENCOUNTER FOR ANNUAL HEALTH EXAMINATION: ICD-10-CM

## 2023-02-16 LAB
CHOLEST SERPL-MCNC: 197 MG/DL (ref ?–200)
FASTING PATIENT LIPID ANSWER: YES
HDLC SERPL-MCNC: 74 MG/DL (ref 40–59)
LDLC SERPL CALC-MCNC: 105 MG/DL (ref ?–100)
NONHDLC SERPL-MCNC: 123 MG/DL (ref ?–130)
TRIGL SERPL-MCNC: 104 MG/DL (ref 30–149)
VLDLC SERPL CALC-MCNC: 17 MG/DL (ref 0–30)

## 2023-02-16 PROCEDURE — 80061 LIPID PANEL: CPT

## 2023-02-16 PROCEDURE — 36415 COLL VENOUS BLD VENIPUNCTURE: CPT

## 2023-02-21 ENCOUNTER — OFFICE VISIT (OUTPATIENT)
Dept: INTERNAL MEDICINE CLINIC | Facility: CLINIC | Age: 83
End: 2023-02-21
Payer: MEDICARE

## 2023-02-21 VITALS — SYSTOLIC BLOOD PRESSURE: 120 MMHG | DIASTOLIC BLOOD PRESSURE: 82 MMHG | HEART RATE: 84 BPM

## 2023-02-21 DIAGNOSIS — K21.9 GASTROESOPHAGEAL REFLUX DISEASE, UNSPECIFIED WHETHER ESOPHAGITIS PRESENT: ICD-10-CM

## 2023-02-21 DIAGNOSIS — K44.9 HIATAL HERNIA: ICD-10-CM

## 2023-02-21 DIAGNOSIS — L57.0 ACTINIC KERATOSES: ICD-10-CM

## 2023-02-21 DIAGNOSIS — Z12.31 ENCOUNTER FOR SCREENING MAMMOGRAM FOR MALIGNANT NEOPLASM OF BREAST: ICD-10-CM

## 2023-02-21 DIAGNOSIS — B39.2 PULMONARY GRANULOMA OF HISTOPLASMOSIS (HCC): ICD-10-CM

## 2023-02-21 DIAGNOSIS — E04.2 MULTINODULAR GOITER (NONTOXIC): ICD-10-CM

## 2023-02-21 DIAGNOSIS — G47.33 OBSTRUCTIVE SLEEP APNEA (ADULT) (PEDIATRIC): ICD-10-CM

## 2023-02-21 DIAGNOSIS — Z00.00 ENCOUNTER FOR ANNUAL HEALTH EXAMINATION: Primary | ICD-10-CM

## 2023-02-21 DIAGNOSIS — E78.00 PURE HYPERCHOLESTEROLEMIA: ICD-10-CM

## 2023-02-21 DIAGNOSIS — Z78.0 POST-MENOPAUSAL: ICD-10-CM

## 2023-02-21 DIAGNOSIS — R06.09 DOE (DYSPNEA ON EXERTION): ICD-10-CM

## 2023-02-21 DIAGNOSIS — B39.2 PULMONARY HISTOPLASMOSIS (HCC): ICD-10-CM

## 2023-02-21 DIAGNOSIS — Z12.83 SCREENING FOR SKIN CANCER: ICD-10-CM

## 2023-02-21 DIAGNOSIS — N32.81 OAB (OVERACTIVE BLADDER): ICD-10-CM

## 2023-02-21 PROCEDURE — 96160 PT-FOCUSED HLTH RISK ASSMT: CPT | Performed by: INTERNAL MEDICINE

## 2023-02-21 PROCEDURE — 3074F SYST BP LT 130 MM HG: CPT | Performed by: INTERNAL MEDICINE

## 2023-02-21 PROCEDURE — 1126F AMNT PAIN NOTED NONE PRSNT: CPT | Performed by: INTERNAL MEDICINE

## 2023-02-21 PROCEDURE — G0439 PPPS, SUBSEQ VISIT: HCPCS | Performed by: INTERNAL MEDICINE

## 2023-02-21 PROCEDURE — 99397 PER PM REEVAL EST PAT 65+ YR: CPT | Performed by: INTERNAL MEDICINE

## 2023-02-21 PROCEDURE — 3079F DIAST BP 80-89 MM HG: CPT | Performed by: INTERNAL MEDICINE

## 2023-04-14 ENCOUNTER — HOSPITAL ENCOUNTER (OUTPATIENT)
Dept: MAMMOGRAPHY | Age: 83
Discharge: HOME | End: 2023-04-14
Attending: INTERNAL MEDICINE
Payer: MEDICARE

## 2023-04-14 ENCOUNTER — HOSPITAL ENCOUNTER (OUTPATIENT)
Dept: BONE DENSITY | Age: 83
Discharge: HOME | End: 2023-04-14
Attending: INTERNAL MEDICINE
Payer: MEDICARE

## 2023-04-14 DIAGNOSIS — Z78.0 POST-MENOPAUSAL: ICD-10-CM

## 2023-04-14 DIAGNOSIS — Z12.31 ENCOUNTER FOR SCREENING MAMMOGRAM FOR MALIGNANT NEOPLASM OF BREAST: ICD-10-CM

## 2023-04-14 PROCEDURE — 77080 DXA BONE DENSITY AXIAL: CPT | Performed by: INTERNAL MEDICINE

## 2023-04-14 PROCEDURE — 77063 BREAST TOMOSYNTHESIS BI: CPT | Performed by: INTERNAL MEDICINE

## 2023-04-14 PROCEDURE — 77067 SCR MAMMO BI INCL CAD: CPT | Performed by: INTERNAL MEDICINE

## 2023-04-28 ENCOUNTER — APPOINTMENT (OUTPATIENT)
Dept: GENERAL RADIOLOGY | Age: 83
End: 2023-04-28
Attending: NURSE PRACTITIONER
Payer: MEDICARE

## 2023-04-28 ENCOUNTER — HOSPITAL ENCOUNTER (OUTPATIENT)
Age: 83
Discharge: HOME | End: 2023-04-28
Payer: MEDICARE

## 2023-04-28 VITALS
WEIGHT: 150 LBS | HEIGHT: 62 IN | BODY MASS INDEX: 27.6 KG/M2 | RESPIRATION RATE: 26 BRPM | DIASTOLIC BLOOD PRESSURE: 77 MMHG | SYSTOLIC BLOOD PRESSURE: 138 MMHG | TEMPERATURE: 97 F | OXYGEN SATURATION: 96 % | HEART RATE: 89 BPM

## 2023-04-28 DIAGNOSIS — R07.9 ACUTE CHEST PAIN: Primary | ICD-10-CM

## 2023-04-28 DIAGNOSIS — S20.211A CONTUSION OF RIGHT CHEST WALL, INITIAL ENCOUNTER: ICD-10-CM

## 2023-04-28 LAB
ATRIAL RATE: 73 BPM
P AXIS: 55 DEGREES
P-R INTERVAL: 204 MS
Q-T INTERVAL: 366 MS
QRS DURATION: 70 MS
QTC CALCULATION (BEZET): 403 MS
R AXIS: -7 DEGREES
T AXIS: 10 DEGREES
VENTRICULAR RATE: 73 BPM

## 2023-04-28 PROCEDURE — 93000 ELECTROCARDIOGRAM COMPLETE: CPT | Performed by: NURSE PRACTITIONER

## 2023-04-28 PROCEDURE — 99213 OFFICE O/P EST LOW 20 MIN: CPT | Performed by: NURSE PRACTITIONER

## 2023-04-28 PROCEDURE — 71111 X-RAY EXAM RIBS/CHEST4/> VWS: CPT | Performed by: NURSE PRACTITIONER

## 2023-04-28 NOTE — ED INITIAL ASSESSMENT (HPI)
Pt c/o chest pain, rpt pain started Monday when she tripped and fell forward onto the seat of an antique chair.  Denies head and neck pain, denies LOC, denies blood thinning medication

## 2023-07-24 DIAGNOSIS — K21.00 GASTROESOPHAGEAL REFLUX DISEASE WITH ESOPHAGITIS: ICD-10-CM

## 2023-07-24 RX ORDER — OMEPRAZOLE 20 MG/1
20 CAPSULE, DELAYED RELEASE ORAL EVERY MORNING
Qty: 90 CAPSULE | Refills: 0 | Status: SHIPPED | OUTPATIENT
Start: 2023-07-24

## 2023-07-24 NOTE — TELEPHONE ENCOUNTER
Last VISIT - 2/21/23 Well adult    Last CPE - 2/21/23    Last REFILL -     OMEPRAZOLE 20 MG Oral Capsule Delayed Release 90 capsule 3 10/19/2022     Last LABS - 12/27/22 cbc, cmp, tsh 2/16/23 lipid    Future Appointments   Date Time Provider Celsa Griffin   10/9/2023  5:00 PM Gio Soto MD [facility]     Per PROTOCOL? None    Please Approve or Deny.

## 2023-07-24 NOTE — TELEPHONE ENCOUNTER
Which pharmacy:  [facility] #[id number] - [address], [address] [address], [phone] [[id number]]     Prescription Refill Request - Patient advised can take 48-72 hours.       Name of Medication (strength, dose, qty requested:   OMEPRAZOLE 20 MG Oral Capsule Delayed Release 90 capsule 3 10/19/2022    Sig:   TAKE 1 CAPSULE BY MOUTH IN  THE MORNING

## 2023-08-25 DIAGNOSIS — E78.00 PURE HYPERCHOLESTEROLEMIA: ICD-10-CM

## 2023-08-29 RX ORDER — SIMVASTATIN 20 MG
20 TABLET ORAL NIGHTLY
Qty: 90 TABLET | Refills: 0 | Status: SHIPPED | OUTPATIENT
Start: 2023-08-29

## 2023-10-09 ENCOUNTER — OFFICE VISIT (OUTPATIENT)
Dept: INTERNAL MEDICINE CLINIC | Facility: CLINIC | Age: 83
End: 2023-10-09
Payer: MEDICARE

## 2023-10-09 VITALS
OXYGEN SATURATION: 96 % | RESPIRATION RATE: 16 BRPM | SYSTOLIC BLOOD PRESSURE: 138 MMHG | WEIGHT: 157.19 LBS | DIASTOLIC BLOOD PRESSURE: 82 MMHG | HEIGHT: 62 IN | TEMPERATURE: 97 F | HEART RATE: 64 BPM | BODY MASS INDEX: 28.93 KG/M2

## 2023-10-09 DIAGNOSIS — K21.9 GASTROESOPHAGEAL REFLUX DISEASE, UNSPECIFIED WHETHER ESOPHAGITIS PRESENT: Primary | ICD-10-CM

## 2023-10-09 DIAGNOSIS — L71.9 ROSACEA: ICD-10-CM

## 2023-10-09 DIAGNOSIS — R63.5 WEIGHT GAIN: ICD-10-CM

## 2023-10-09 DIAGNOSIS — E78.00 PURE HYPERCHOLESTEROLEMIA: ICD-10-CM

## 2023-10-09 PROCEDURE — 3075F SYST BP GE 130 - 139MM HG: CPT | Performed by: INTERNAL MEDICINE

## 2023-10-09 PROCEDURE — 1159F MED LIST DOCD IN RCRD: CPT | Performed by: INTERNAL MEDICINE

## 2023-10-09 PROCEDURE — 3079F DIAST BP 80-89 MM HG: CPT | Performed by: INTERNAL MEDICINE

## 2023-10-09 PROCEDURE — 1170F FXNL STATUS ASSESSED: CPT | Performed by: INTERNAL MEDICINE

## 2023-10-09 PROCEDURE — 1160F RVW MEDS BY RX/DR IN RCRD: CPT | Performed by: INTERNAL MEDICINE

## 2023-10-09 PROCEDURE — 1126F AMNT PAIN NOTED NONE PRSNT: CPT | Performed by: INTERNAL MEDICINE

## 2023-10-09 PROCEDURE — 99214 OFFICE O/P EST MOD 30 MIN: CPT | Performed by: INTERNAL MEDICINE

## 2023-10-09 PROCEDURE — 3008F BODY MASS INDEX DOCD: CPT | Performed by: INTERNAL MEDICINE

## 2023-10-09 RX ORDER — SULFACETAMIDE SODIUM AND SULFUR 10; 5 MG/G; MG/G
5 RINSE TOPICAL 2 TIMES DAILY
COMMUNITY
Start: 2023-05-12

## 2023-10-19 DIAGNOSIS — K21.00 GASTROESOPHAGEAL REFLUX DISEASE WITH ESOPHAGITIS: ICD-10-CM

## 2023-10-19 RX ORDER — OMEPRAZOLE 20 MG/1
20 CAPSULE, DELAYED RELEASE ORAL EVERY MORNING
Qty: 90 CAPSULE | Refills: 1 | Status: SHIPPED | OUTPATIENT
Start: 2023-10-19

## 2023-10-19 NOTE — TELEPHONE ENCOUNTER
Visit 10/9/23 for GERD     Gastroesophageal reflux disease, unspecified whether esophagitis present- encouraged GERD diet, continue omeprazole.  Monitor renal panel on PPI

## 2023-11-26 DIAGNOSIS — E78.00 PURE HYPERCHOLESTEROLEMIA: ICD-10-CM

## 2023-11-27 RX ORDER — SIMVASTATIN 20 MG
20 TABLET ORAL NIGHTLY
Qty: 90 TABLET | Refills: 0 | Status: SHIPPED | OUTPATIENT
Start: 2023-11-27

## 2024-02-14 ENCOUNTER — LAB ENCOUNTER (OUTPATIENT)
Dept: LAB | Age: 84
End: 2024-02-14
Attending: INTERNAL MEDICINE
Payer: MEDICARE

## 2024-02-14 DIAGNOSIS — K21.9 GASTROESOPHAGEAL REFLUX DISEASE, UNSPECIFIED WHETHER ESOPHAGITIS PRESENT: ICD-10-CM

## 2024-02-14 DIAGNOSIS — E78.00 PURE HYPERCHOLESTEROLEMIA: ICD-10-CM

## 2024-02-14 DIAGNOSIS — R63.5 WEIGHT GAIN: ICD-10-CM

## 2024-02-14 LAB
ALBUMIN SERPL-MCNC: 3.5 G/DL (ref 3.4–5)
ALBUMIN/GLOB SERPL: 1 {RATIO} (ref 1–2)
ALP LIVER SERPL-CCNC: 126 U/L
ALT SERPL-CCNC: 16 U/L
ANION GAP SERPL CALC-SCNC: 5 MMOL/L (ref 0–18)
AST SERPL-CCNC: 21 U/L (ref 15–37)
BASOPHILS # BLD AUTO: 0.04 X10(3) UL (ref 0–0.2)
BASOPHILS NFR BLD AUTO: 0.6 %
BILIRUB SERPL-MCNC: 0.6 MG/DL (ref 0.1–2)
BUN BLD-MCNC: 16 MG/DL (ref 9–23)
CALCIUM BLD-MCNC: 9.3 MG/DL (ref 8.5–10.1)
CHLORIDE SERPL-SCNC: 109 MMOL/L (ref 98–112)
CHOLEST SERPL-MCNC: 181 MG/DL (ref ?–200)
CO2 SERPL-SCNC: 25 MMOL/L (ref 21–32)
CREAT BLD-MCNC: 0.82 MG/DL
EGFRCR SERPLBLD CKD-EPI 2021: 70 ML/MIN/1.73M2 (ref 60–?)
EOSINOPHIL # BLD AUTO: 0.09 X10(3) UL (ref 0–0.7)
EOSINOPHIL NFR BLD AUTO: 1.3 %
ERYTHROCYTE [DISTWIDTH] IN BLOOD BY AUTOMATED COUNT: 13.9 %
FASTING PATIENT LIPID ANSWER: YES
FASTING STATUS PATIENT QL REPORTED: YES
GLOBULIN PLAS-MCNC: 3.5 G/DL (ref 2.8–4.4)
GLUCOSE BLD-MCNC: 94 MG/DL (ref 70–99)
HCT VFR BLD AUTO: 42.6 %
HDLC SERPL-MCNC: 73 MG/DL (ref 40–59)
HGB BLD-MCNC: 13.9 G/DL
IMM GRANULOCYTES # BLD AUTO: 0.02 X10(3) UL (ref 0–1)
IMM GRANULOCYTES NFR BLD: 0.3 %
LDLC SERPL CALC-MCNC: 96 MG/DL (ref ?–100)
LYMPHOCYTES # BLD AUTO: 2.45 X10(3) UL (ref 1–4)
LYMPHOCYTES NFR BLD AUTO: 36.2 %
MCH RBC QN AUTO: 29 PG (ref 26–34)
MCHC RBC AUTO-ENTMCNC: 32.6 G/DL (ref 31–37)
MCV RBC AUTO: 88.8 FL
MONOCYTES # BLD AUTO: 0.56 X10(3) UL (ref 0.1–1)
MONOCYTES NFR BLD AUTO: 8.3 %
NEUTROPHILS # BLD AUTO: 3.61 X10 (3) UL (ref 1.5–7.7)
NEUTROPHILS # BLD AUTO: 3.61 X10(3) UL (ref 1.5–7.7)
NEUTROPHILS NFR BLD AUTO: 53.3 %
NONHDLC SERPL-MCNC: 108 MG/DL (ref ?–130)
OSMOLALITY SERPL CALC.SUM OF ELEC: 289 MOSM/KG (ref 275–295)
PLATELET # BLD AUTO: 194 10(3)UL (ref 150–450)
POTASSIUM SERPL-SCNC: 4 MMOL/L (ref 3.5–5.1)
PROT SERPL-MCNC: 7 G/DL (ref 6.4–8.2)
RBC # BLD AUTO: 4.8 X10(6)UL
SODIUM SERPL-SCNC: 139 MMOL/L (ref 136–145)
TRIGL SERPL-MCNC: 61 MG/DL (ref 30–149)
TSI SER-ACNC: 2.07 MIU/ML (ref 0.36–3.74)
VLDLC SERPL CALC-MCNC: 10 MG/DL (ref 0–30)
WBC # BLD AUTO: 6.8 X10(3) UL (ref 4–11)

## 2024-02-14 PROCEDURE — 80053 COMPREHEN METABOLIC PANEL: CPT

## 2024-02-14 PROCEDURE — 80061 LIPID PANEL: CPT

## 2024-02-14 PROCEDURE — 85025 COMPLETE CBC W/AUTO DIFF WBC: CPT

## 2024-02-14 PROCEDURE — 84443 ASSAY THYROID STIM HORMONE: CPT

## 2024-02-14 PROCEDURE — 36415 COLL VENOUS BLD VENIPUNCTURE: CPT

## 2024-02-23 ENCOUNTER — OFFICE VISIT (OUTPATIENT)
Dept: INTERNAL MEDICINE CLINIC | Facility: CLINIC | Age: 84
End: 2024-02-23
Payer: MEDICARE

## 2024-02-23 VITALS
OXYGEN SATURATION: 98 % | TEMPERATURE: 98 F | SYSTOLIC BLOOD PRESSURE: 132 MMHG | DIASTOLIC BLOOD PRESSURE: 80 MMHG | WEIGHT: 155.81 LBS | HEART RATE: 97 BPM | HEIGHT: 62 IN | BODY MASS INDEX: 28.67 KG/M2

## 2024-02-23 DIAGNOSIS — Z00.00 ENCOUNTER FOR WELLNESS EXAMINATION: Primary | Chronic | ICD-10-CM

## 2024-02-23 DIAGNOSIS — B39.2 PULMONARY GRANULOMA OF HISTOPLASMOSIS (HCC): ICD-10-CM

## 2024-02-23 DIAGNOSIS — Z12.31 ENCOUNTER FOR SCREENING MAMMOGRAM FOR MALIGNANT NEOPLASM OF BREAST: ICD-10-CM

## 2024-02-23 DIAGNOSIS — R06.09 DOE (DYSPNEA ON EXERTION): ICD-10-CM

## 2024-02-23 DIAGNOSIS — E78.00 PURE HYPERCHOLESTEROLEMIA: ICD-10-CM

## 2024-02-23 DIAGNOSIS — G47.33 OBSTRUCTIVE SLEEP APNEA (ADULT) (PEDIATRIC): ICD-10-CM

## 2024-02-23 DIAGNOSIS — L71.9 ROSACEA: ICD-10-CM

## 2024-02-23 DIAGNOSIS — K21.9 GASTROESOPHAGEAL REFLUX DISEASE, UNSPECIFIED WHETHER ESOPHAGITIS PRESENT: ICD-10-CM

## 2024-02-23 DIAGNOSIS — L57.0 ACTINIC KERATOSES: ICD-10-CM

## 2024-02-23 DIAGNOSIS — N32.81 OAB (OVERACTIVE BLADDER): ICD-10-CM

## 2024-02-23 DIAGNOSIS — E04.2 MULTINODULAR GOITER (NONTOXIC): ICD-10-CM

## 2024-02-23 RX ORDER — SIMVASTATIN 20 MG
20 TABLET ORAL NIGHTLY
Qty: 90 TABLET | Refills: 3 | Status: SHIPPED | OUTPATIENT
Start: 2024-02-23

## 2024-02-23 NOTE — PROGRESS NOTES
Subjective:   Mitzi Miles is a 83 year old female who presents for a MA (Medicare Advantage) Supervisit (Once per calendar year) and scheduled follow up of multiple significant but stable problems.     1. Encounter for wellness examination (Primary)- patient is utd on dexa, referred for mammogram. She no longer needs screening colonoscopy at age 83. She just recovered from covid 19 infection in January and is overall doing well.   2. Encounter for screening mammogram for malignant neoplasm of breast  -     [facility] LUZ ELENA 2D+3D SCREENING BILAT (CPT=77067/51941); Future; Expected date: 02/23/2024  3. Pure hypercholesterolemia- controlled, no myalgias on simvastatin  4. Obstructive sleep apnea, mild-oral appliance  Overview:  Could not toleraie CPAP;  Finally could tolerate oral appliance.  Had conflict with Dr. Dr. Gallego re: optimal treatment modality.    Orders:  -     Pulmonary Referral - In Network  5. RODRIGUES (dyspnea on exertion)- symptoms for over a year, CXR with chronic changes, cardiac work up negative. Will get pulmonary opinion  -     Pulmonary Referral - In Network  6. Multinodular goiter (nontoxic)- stable, she has appointment with Dr. Amezcua  7. Pulmonary granuloma of histoplasmosis (HCC)- pt use to see Dr. Quinteros, will be switching to [place] [facility]  8. Rosacea- flared recently since Jan, she follows with Dr. Owen  9. Actinic keratoses- stable, f/u derm for skin check  10. OAB (overactive bladder)- doing ok, no acute  complaints  11. Gastroesophageal reflux disease, unspecified whether esophagitis present- controlled on omeprazole    History/Other:   Fall Risk Assessment:   She has been screened for Falls and is low risk.      Cognitive Assessment:   She had a completely normal cognitive assessment - see flowsheet entries       Functional Ability/Status:   Mitzi Miles has some abnormal functions as listed below:  She has Driving difficulties based on screening of functional status. She has  problems with Memory based on screening of functional status.       Depression Screening (PHQ-2/PHQ-9): PHQ-2 SCORE: 0  , done 2/23/2024            Advanced Directives:   She does have a Living Will but we do NOT have it on file in Epic.    She does NOT have a Power of  for Health Care. [Do you have a healthcare power of ?: No]  Not discussed      Patient Active Problem List   Diagnosis    Obstructive sleep apnea, mild-oral appliance    Pure hypercholesterolemia    Hiatal hernia    Gastroesophageal reflux disease    Osteopenia    Actinic keratoses    Rosacea    Glucose intolerance    Pulmonary granuloma of histoplasmosis (HCC)    Multinodular goiter (nontoxic)    OAB (overactive bladder)    RODRIGUES (dyspnea on exertion)     Allergies:  She has No Known Allergies.    Current Medications:  Outpatient Medications Marked as Taking for the 2/23/24 encounter (Office Visit) with Grace Figueroa MD   Medication Sig    simvastatin 20 MG Oral Tab Take 1 tablet (20 mg total) by mouth nightly.    omeprazole 20 MG Oral Capsule Delayed Release TAKE 1 CAPSULE BY MOUTH EVERY DAY IN THE MORNING    metRONIDAZOLE 0.75 % External Cream Apply 0.75 % topically 2 (two) times daily.    Sulfacetamide Sodium-Sulfur 10-5 % External Liquid Apply 5 % topically in the morning and 5 % before bedtime.    Ergocalciferol (VITAMIN D OR) Take by mouth.    Calcium Carbonate-Vitamin D 500-125 MG-UNIT Oral Tab Take by mouth. Take one tablet daily       Medical History:  She  has a past medical history of Blepharitis of both eyes (8/6/12- Dr NEIL Ibrahim), Colon polyp (9/22/2015), Esophageal reflux, High cholesterol, Irritable bowel syndrome, Obstructive sleep apnea and central apnea (08/28/2017), Shingles (2013), Sleep apnea, and Visual impairment.  Surgical History:  She  has a past surgical history that includes colonoscopy (1984 2004, 2010); upper gi endoscopy,exam (2011); other surgical history (2009); total abdom hysterectomy (1992);  upper gi endoscopy - referral (9/22/2015); and colonoscopy (09/22/2015).   Family History:  Her family history includes Alzheimer's in her mother; Cancer (age of onset: 76) in her father; Heart Attack in her paternal grandfather; Stroke in her maternal grandmother; TB in her paternal grandmother.  Social History:  She  reports that she has never smoked. She has never used smokeless tobacco. She reports that she does not drink alcohol and does not use drugs.    Tobacco:  She has never smoked tobacco.    CAGE Alcohol Screen:   CAGE screening score of 0 on 2/23/2024, showing low risk of alcohol abuse.      Patient Care Team:  Grace Figueroa MD as PCP - General (Internal Medicine)  Pj Cisse MD (GASTROENTEROLOGY)  Knobloch, Keeley, PT as Physical Therapist    Review of Systems     Negative except chronic RODRIGUES, reflux symptoms controlled on omeprazole    Objective:   Physical Exam  General Appearance:  Alert, cooperative, no distress, appears stated age   Head:  Normocephalic, without obvious abnormality, atraumatic   Eyes:  PERRL, conjunctiva/corneas clear, EOM's intact both eyes   Ears:  Normal TM's and external ear canals, both ears   Nose: Nares normal, septum midline,mucosa normal   Throat: Lips, mucosa, and tongue normal; teeth and gums normal   Neck: Supple, symmetrical, trachea midline, no JVD   Back:   Symmetric, no curvature, ROM normal, no CVA tenderness   Lungs:   Clear to auscultation bilaterally, respirations unlabored   Heart:  Regular rate and rhythm, S1 and S2 normal   Abdomen:   Soft, non-tender, bowel sounds active all four quadrants,  no masses, no organomegaly   Pelvic: Deferred   Extremities: Extremities normal, atraumatic, no cyanosis or edema   Pulses: 2+ and symmetric   Skin: Facial rosacea   Lymph nodes: Cervical, supraclavicular, and axillary nodes normal   Neurologic: Alert and oriented       /80 (BP Location: Left arm, Patient Position: Sitting, Cuff Size: adult)   Pulse 97   Temp  97.7 °F (36.5 °C) (Temporal)   Ht 5' 2\" (1.575 m)   Wt 155 lb 12.8 oz (70.7 kg)   LMP 07/23/1992 (LMP Unknown)   SpO2 98%   BMI 28.50 kg/m²  Estimated body mass index is 28.5 kg/m² as calculated from the following:    Height as of this encounter: 5' 2\" (1.575 m).    Weight as of this encounter: 155 lb 12.8 oz (70.7 kg).    Medicare Hearing Assessment:   Hearing Screening    Time taken: 2/23/2024  1:06 PM  Entry User: Sheri Escobedo  Screening Method: Finger Rub  Finger Rub Result: Pass               Visual Acuity:   Right Eye Visual Acuity: Corrected Right Eye Chart Acuity: 20/30   Left Eye Visual Acuity: Corrected Left Eye Chart Acuity: 20/25   Both Eyes Visual Acuity: Corrected Both Eyes Chart Acuity: 20/25   Able To Tolerate Visual Acuity: Yes        Assessment & Plan:   Mitzi Miles is a 83 year old female who presents for a Medicare Assessment.     1. Encounter for wellness examination (Primary)- patient is utd on dexa, referred for mammogram. She no longer needs screening colonoscopy at age 83. She just recovered from covid 19 infection in January and is overall doing well.   2. Encounter for screening mammogram for malignant neoplasm of breast  -     [facility] LUZ ELENA 2D+3D SCREENING BILAT (CPT=77067/82032); Future; Expected date: 02/23/2024  3. Pure hypercholesterolemia- controlled, CPM  -     Simvastatin; Take 1 tablet (20 mg total) by mouth nightly.  Dispense: 90 tablet; Refill: 3  4. Obstructive sleep apnea, mild-oral appliance  Overview:  Could not toleraie CPAP;  Finally could tolerate oral appliance.  Had conflict with Dr. Dr. Gallego re: optimal treatment modality.    Orders:  -     Pulmonary Referral - In Network  5. RODRIGUES (dyspnea on exertion)- symptoms for over a year, cardiac work up negative. Will get pulmonary opinion  -     Pulmonary Referral - In Network  6. Multinodular goiter (nontoxic)- stable, she has appointment with Dr. Amezcua  7. Pulmonary granuloma of histoplasmosis (HCC)- pt use to see   [facility], will be switching to [facility]    8. Rosacea- flared recently since Jan, she follows with Dr. Owen  9. Actinic keratoses- stable, f/u derm for skin check  10. OAB (overactive bladder)- doing ok, no acute  complaints  11. Gastroesophageal reflux disease, unspecified whether esophagitis present- controlled on omeprazole,CPM    The patient indicates understanding of these issues and agrees to the plan.  Continue with current treatment plan.  Reinforced healthy diet, lifestyle, and exercise.      Return in about 1 year (around 2/23/2025), or if symptoms worsen or fail to improve, for wellness.     Grace Figueroa MD, 2/23/2024     Supplementary Documentation:   General Health:  In the past six months, have you lost more than 10 pounds without trying?: 2 - No  Has your appetite been poor?: No  Type of Diet: Balanced  How does the patient maintain a good energy level?: Daily Walks  How would you describe your daily physical activity?: Moderate  How would you describe your current health state?: Good  How do you maintain positive mental well-being?: Social Interaction  On a scale of 0 to 10, with 0 being no pain and 10 being severe pain, what is your pain level?: 4 - (Moderate)  In the past six months, have you experienced urine leakage?: 1-Yes  At any time do you feel concerned for the safety/well-being of yourself and/or your children, in your home or elsewhere?: No  Have you had any immunizations at another office such as Influenza, Hepatitis B, Tetanus, or Pneumococcal?: No       Mitzi Miles's SCREENING SCHEDULE   Tests on this list are recommended by your physician but may not be covered, or covered at this frequency, by your insurer.   Please check with your insurance carrier before scheduling to verify coverage.   PREVENTATIVE SERVICES FREQUENCY &  COVERAGE DETAILS LAST COMPLETION DATE   Diabetes Screening    Fasting Blood Sugar /  Glucose    One screening every 12 months if never tested or  if previously tested but not diagnosed with pre-diabetes   One screening every 6 months if diagnosed with pre-diabetes Lab Results   Component Value Date    GLU 94 02/14/2024        Cardiovascular Disease Screening    Lipid Panel  Cholesterol  Lipoprotein (HDL)  Triglycerides Covered every 5 years for all Medicare beneficiaries without apparent signs or symptoms of cardiovascular disease Lab Results   Component Value Date    CHOLEST 181 02/14/2024    HDL 73 (H) 02/14/2024    LDL 96 02/14/2024    TRIG 61 02/14/2024         Electrocardiogram (EKG)   Covered if needed at Welcome to Medicare, and non-screening if indicated for medical reasons 04/28/2023      Ultrasound Screening for Abdominal Aortic Aneurysm (AAA) Covered once in a lifetime for one of the following risk factors    Men who are 65-75 years old and have ever smoked    Anyone with a family history -     Colorectal Cancer Screening  Covered for ages 50-85; only need ONE of the following:    Colonoscopy   Covered every 10 years    Covered every 2 years if patient is at high risk or previous colonoscopy was abnormal -    No recommendations at this time    Flexible Sigmoidoscopy   Covered every 4 years -    Fecal Occult Blood Test Covered annually -   Bone Density Screening    Bone density screening    Covered every 2 years after age 65 if diagnosed with risk of osteoporosis or estrogen deficiency.    Covered yearly for long-term glucocorticoid medication use (Steroids) Last Dexa Scan:    XR DEXA BONE DENSITOMETRY (CPT=77080) 04/14/2023      No recommendations at this time   Pap and Pelvic    Pap   Covered every 2 years for women at normal risk; Annually if at high risk -  No recommendations at this time    Chlamydia Annually if high risk -  No recommendations at this time   Screening Mammogram    Mammogram     Recommend annually for all female patients aged 40 and older    One baseline mammogram covered for patients aged 35-39 04/14/2023    No recommendations  at this time    Immunizations    Influenza Covered once per flu season  Please get every year 11/06/2023  No recommendations at this time    Pneumococcal Each vaccine ([id number] & [id number]) covered once after 65 Prevnar 13: 07/23/2016    [id number]: 06/28/2015     No recommendations at this time    Hepatitis B One screening covered for patients with certain risk factors   -  No recommendations at this time    Tetanus Toxoid Not covered by Medicare Part B unless medically necessary (cut with metal); may be covered with your pharmacy prescription benefits 01/07/2006    Tetanus, Diptheria and Pertusis TD and TDaP Not covered by Medicare Part B -  No recommendations at this time    Zoster Not covered by Medicare Part B; may be covered with your pharmacy  prescription benefits 08/07/2012  No recommendations at this time

## 2024-04-17 DIAGNOSIS — K21.00 GASTROESOPHAGEAL REFLUX DISEASE WITH ESOPHAGITIS: ICD-10-CM

## 2024-04-17 RX ORDER — OMEPRAZOLE 20 MG/1
20 CAPSULE, DELAYED RELEASE ORAL EVERY MORNING
Qty: 90 CAPSULE | Refills: 1 | Status: SHIPPED | OUTPATIENT
Start: 2024-04-17

## 2024-05-07 ENCOUNTER — APPOINTMENT (OUTPATIENT)
Dept: GENERAL RADIOLOGY | Facility: HOSPITAL | Age: 84
End: 2024-05-07
Attending: EMERGENCY MEDICINE
Payer: MEDICARE

## 2024-05-07 ENCOUNTER — APPOINTMENT (OUTPATIENT)
Dept: CT IMAGING | Facility: HOSPITAL | Age: 84
End: 2024-05-07
Attending: EMERGENCY MEDICINE
Payer: MEDICARE

## 2024-05-07 ENCOUNTER — HOSPITAL ENCOUNTER (EMERGENCY)
Facility: HOSPITAL | Age: 84
Discharge: HOME | End: 2024-05-07
Attending: EMERGENCY MEDICINE
Payer: MEDICARE

## 2024-05-07 VITALS
DIASTOLIC BLOOD PRESSURE: 73 MMHG | HEIGHT: 62 IN | RESPIRATION RATE: 16 BRPM | WEIGHT: 153 LBS | OXYGEN SATURATION: 92 % | SYSTOLIC BLOOD PRESSURE: 155 MMHG | BODY MASS INDEX: 28.16 KG/M2 | HEART RATE: 63 BPM | TEMPERATURE: 97 F

## 2024-05-07 DIAGNOSIS — S42.92XA CLOSED FRACTURE OF LEFT SHOULDER, INITIAL ENCOUNTER: Primary | ICD-10-CM

## 2024-05-07 PROCEDURE — 73030 X-RAY EXAM OF SHOULDER: CPT | Performed by: EMERGENCY MEDICINE

## 2024-05-07 PROCEDURE — 96375 TX/PRO/DX INJ NEW DRUG ADDON: CPT

## 2024-05-07 PROCEDURE — 73200 CT UPPER EXTREMITY W/O DYE: CPT | Performed by: EMERGENCY MEDICINE

## 2024-05-07 PROCEDURE — 99284 EMERGENCY DEPT VISIT MOD MDM: CPT

## 2024-05-07 PROCEDURE — 76377 3D RENDER W/INTRP POSTPROCES: CPT | Performed by: EMERGENCY MEDICINE

## 2024-05-07 PROCEDURE — 96374 THER/PROPH/DIAG INJ IV PUSH: CPT

## 2024-05-07 PROCEDURE — 96376 TX/PRO/DX INJ SAME DRUG ADON: CPT

## 2024-05-07 PROCEDURE — 99285 EMERGENCY DEPT VISIT HI MDM: CPT

## 2024-05-07 RX ORDER — MORPHINE SULFATE 4 MG/ML
4 INJECTION, SOLUTION INTRAMUSCULAR; INTRAVENOUS ONCE
Status: COMPLETED | OUTPATIENT
Start: 2024-05-07 | End: 2024-05-07

## 2024-05-07 RX ORDER — HYDROCODONE BITARTRATE AND ACETAMINOPHEN 5; 325 MG/1; MG/1
1 TABLET ORAL ONCE
Status: DISCONTINUED | OUTPATIENT
Start: 2024-05-07 | End: 2024-05-07

## 2024-05-07 RX ORDER — ACETAMINOPHEN AND CODEINE PHOSPHATE 300; 30 MG/1; MG/1
1 TABLET ORAL EVERY 6 HOURS PRN
Qty: 10 TABLET | Refills: 0 | Status: SHIPPED | OUTPATIENT
Start: 2024-05-07 | End: 2024-05-12

## 2024-05-07 RX ORDER — ONDANSETRON 2 MG/ML
4 INJECTION INTRAMUSCULAR; INTRAVENOUS ONCE
Status: COMPLETED | OUTPATIENT
Start: 2024-05-07 | End: 2024-05-07

## 2024-05-08 ENCOUNTER — TELEPHONE (OUTPATIENT)
Dept: INTERNAL MEDICINE CLINIC | Facility: CLINIC | Age: 84
End: 2024-05-08

## 2024-05-08 NOTE — ED INITIAL ASSESSMENT (HPI)
Pt walking down stairs and missed the last step and fell on her L shoulder. Denies LOC, head injury, -blood thinners. No obvious deformity

## 2024-05-08 NOTE — TELEPHONE ENCOUNTER
Patient son Barber called on HIPAA stating the patient went to emergency room yesterday after a fall and has fracture in L shoulder. They are waiting for orthopedic to call her back to get an appointment there and get pain meds-until they call she is in a lot of pain and is wanting something for the pain if Dr. Grace Figueroa can send in prescription to local [facility] on [address]-advised Dr. Grace Figueroa not in today

## 2024-05-08 NOTE — TELEPHONE ENCOUNTER
Spoke to patient who reports she was coming down stairs yesterday, missed bottom step and fell full force onto left shoulder. Went to ER, imaging done, diagnosed with impacted surgical neck fracture of humerus.  Patient in sling but it fell off at night, spouse attempted to put it back on.  Patient given prescription of tylenol with codeine, 1 tablet every 6 hours which is not helping for pain.  Patient requesting stronger medication.  Aware she may need ER follow up appointment.  Patient is waiting for Ortho to call for follow up appointment.    Dr Figueroa - do you want to prescribe anything else?

## 2024-05-09 NOTE — ED PROVIDER NOTES
Patient Seen in: [facility] Emergency Department      History     Chief Complaint   Patient presents with    Fall     Stated Complaint: c/o pain to the L arm, L shoulder, missed a step coming down the stairs. Denies*    Subjective:   HPI    This is a 83-year-old female presents emergency room for evaluation of left shoulder injury.  Patient states that she was walking down the stairs, missed the last step and fell very onto her left shoulder, denies striking her head.  Denies loss of conscious.  Denies neck or back pain.  Denies injury to the right upper extremity, denies pelvic or hip pain denies injury to the lower extremities.  Denies chest pain or shortness of breath denies abdominal pain.  States all of her pain is in her left shoulder, denies numbness tingling or weakness to the extremity.    Objective:   Past Medical History:    Blepharitis of both eyes    Colon polyp    Mid transverse polyp- tubular adenoma, no dysplasia or malignancy     Esophageal reflux    High cholesterol    Irritable bowel syndrome    quiescent    Obstructive sleep apnea and central apnea    [facility] AHI 32 cental apnea AHI 19 SaO2 deric 80 %    Shingles    Subacute episode on nose    Sleep apnea    Visual impairment    glasses              Past Surgical History:   Procedure Laterality Date    Colonoscopy  1984 2004, 2010    Colonoscopy  09/22/2015    snare polypectomy, Int hemorrhoids, Diverticulitis    Other surgical history  2009    Bladder reconstuction-[facility]    Total abdom hysterectomy  1992    yovanny oophorectomy    Upper gi endoscopy - referral  9/22/2015    Hiatal hernia,GE junction,erythematous mucosa-antrum, biopsies done    Upper gi endoscopy,exam  2011                Social History     Socioeconomic History    Marital status:    Occupational History    Occupation:     Tobacco Use    Smoking status: Never    Smokeless tobacco: Never   Vaping Use    Vaping status: Never Used   Substance and Sexual  Activity    Alcohol use: No    Drug use: No    Sexual activity: Not Currently   Other Topics Concern    Caffeine Concern No    Exercise Yes     Comment: walker, Y 3x week               Review of Systems    Positive for stated complaint: c/o pain to the L arm, L shoulder, missed a step coming down the stairs. Denies*  Other systems are as noted in HPI.  Constitutional and vital signs reviewed.      All other systems reviewed and negative except as noted above.    Physical Exam     ED Triage Vitals [05/07/24 1951]   BP (!) 192/78   Pulse 62   Resp 18   Temp 96.8 °F (36 °C)   Temp src Temporal   SpO2 98 %   O2 Device None (Room air)       Current Vitals:   No data recorded        Physical Exam    GENERAL: Patient is awake, alert,  in no acute distress.  HEENT:  no scleral icterus.  Mucous membranes are moist,.  Scalp is atraumatic.  NECK: No midline cervical spine tenderness  HEART: Regular rate and rhythm, no murmurs.  LUNGS: Clear to auscultation bilaterally.  No Rales, no rhonchi, no wheezing, no stridor.  ABDOMEN: Soft, nondistended,non tender  EXTREMITIES: No tenderness to the bilateral clavicles.  No tenderness or deformity to the right upper extremity.  There is tenderness with deformity to the left shoulder, no midshaft or distal humeral tenderness, no tenderness to the left elbow forearm wrist or hand.  Radial and ulnar pulse are present and equal bilaterally.  Pelvis stable no tenderness to the bilateral hips or bilateral lower extremities.    NEUROLOGIC EXAM: Tongue midline, no facial drooping, no ptosis,  strength equal bilaterally, radial ulnar and median nerve tested intact bilaterally.      ED Course   Labs Reviewed - No data to display                   MDM        Differential diagnosis before testing includes but not limited to shoulder fracture, dislocation, subluxation, which is a medical condition that poses a threat to life/function    Radiographic images  I personally reviewed the radiographs  and my individual interpretation shows left shoulder x-ray reviewed, proximal humeral fracture noted  I also reviewed the official reports that showed shoulder x-ray comminuted fracture proximal humerus, anterior-inferior subluxation or dislocation of the humerus.  CT large left hemarthrosis causes anterior inferior subluxation of the humeral head relative the glenoid no damir dislocation.  Comminuted and impacted surgical neck fracture of humerus    History obtained by external source  (EMS, family, law enforcement, )other sources of medical information included history also per , there is been no mental status changes/confusion since fall    Discussion of management (consult/physicians, social work, pharmacy,ect) orthopedics       Medications Provided: IV morphine    Course of Events during Emergency Room Visit include IV established blood work obtained, patient was given pain medication.  X-ray performed which revealed fracture of the humeral head with possible dislocation versus subluxation, discussed with orthopedics who recommended CT, CT was performed which revealed large left hemarthrosis causing inferior subluxation of the humeral head relative the glenoid.  Orthopedics recommended no further intervention at this time other than sling and icing and pain control.  On reevaluation patient states she is feeling much better, will discharge with prescription for pain medication and orthopedic follow-up, instructed to ice the affected area, return to ER if any change or worsening symptoms.  Patient and  agree with plan, discharged good condition    Shared decision making was utilized           Discharge  I have discussed with the patient the results of test, differential diagnosis, treatment plan, warning signs and symptoms which should prompt immediate return.  They expressed understanding of these instructions and agrees to the following plan provided.  They were given written discharge  instructions and agrees to return for any concerns and voiced understanding and all questions were answered.    Note to patient: The 21st Century Cures Act makes medical notes like these available to patients in the interest of transparency. However, this is a medical document intended as peer to peer communication. It is written in medical language and may contain abbreviations or verbiage that are unfamiliar. It may appear blunt or direct. Medical documents are intended to carry relevant information, facts as evident, and the clinical opinion of the practitioner.                                            Medical Decision Making      Disposition and Plan     Clinical Impression:  1. Closed fracture of left shoulder, initial encounter         Disposition:  Discharge  5/7/2024 10:24 pm    Follow-up:  No follow-up provider specified.        Medications Prescribed:  Discharge Medication List as of 5/7/2024 10:45 PM        START taking these medications    Details   acetaminophen-codeine 300-30 MG Oral Tab Take 1 tablet by mouth every 6 (six) hours as needed for Pain., Normal, Disp-10 tablet, R-0

## 2024-05-09 NOTE — TELEPHONE ENCOUNTER
Agree with on call, needs ER follow up. Should see ortho asap if that much pain   Given tylenol 3 from er which is already strong

## 2024-05-09 NOTE — TELEPHONE ENCOUNTER
Called and spoke to pt's , Barber. Pt is getting ready to go to her appt with ortho, they were able to get appt at 10:30 at [facility]. Advised to call to schedule ER f/u when done with ortho appt, seeing ortho is priority. Barber stated understanding and agreed to plan, appreciative of the call.    JACQUELINE TB

## 2024-10-12 DIAGNOSIS — K21.00 GASTROESOPHAGEAL REFLUX DISEASE WITH ESOPHAGITIS: ICD-10-CM

## 2024-10-15 NOTE — TELEPHONE ENCOUNTER
Refill passed per [facility] protocol.  Requested Prescriptions   Pending Prescriptions Disp Refills    OMEPRAZOLE 20 MG Oral Capsule Delayed Release [Pharmacy Med Name: OMEPRAZOLE DR 20 MG CAPSULE] 90 capsule 1     Sig: TAKE 1 CAPSULE BY MOUTH EVERY DAY IN THE MORNING       Gastrointestional Medication Protocol Passed - 10/15/2024 10:35 AM        Passed - In person appointment or virtual visit in the past 12 mos or appointment in next 3 mos     Recent Outpatient Visits              7 months ago Encounter for wellness examination    [facility], [address] [place], [place] [address]Roni Tina, MD    Office Visit    1 year ago Gastroesophageal reflux disease, unspecified whether esophagitis present    [address]    Office Visit    1 year ago Encounter for annual health examination    [address]    Office Visit    1 year ago RODRIGUES (dyspnea on exertion)    [address] Elva Costello PA-C    Office Visit    2 years ago Multinodular goiter (nontoxic)    Endocrinology - Roni Del Valle Falguni, MD    Office Visit                         Recent Outpatient Visits              7 months ago Encounter for wellness examination    [address]    Office Visit    1 year ago Gastroesophageal reflux disease, unspecified whether esophagitis present    [address]    Office Visit    1 year ago Encounter for annual health examination    [address]    Office Visit    1 year ago RODRIGUES (dyspnea on exertion)    [address] Elva Costello PA-C    Office Visit    2 years ago Multinodular goiter (nontoxic)     Endocrinology - Roni Del Valle Falguni, MD    Office Visit

## 2024-10-17 ENCOUNTER — HOSPITAL ENCOUNTER (OUTPATIENT)
Age: 84
Discharge: HOME | End: 2024-10-17
Payer: MEDICARE

## 2024-10-17 VITALS
TEMPERATURE: 98 F | OXYGEN SATURATION: 95 % | SYSTOLIC BLOOD PRESSURE: 160 MMHG | WEIGHT: 147 LBS | HEART RATE: 83 BPM | RESPIRATION RATE: 22 BRPM | HEIGHT: 63 IN | BODY MASS INDEX: 26.05 KG/M2 | DIASTOLIC BLOOD PRESSURE: 94 MMHG

## 2024-10-17 DIAGNOSIS — N30.90 CYSTITIS: Primary | ICD-10-CM

## 2024-10-17 LAB
BILIRUB UR QL STRIP: NEGATIVE
CLARITY UR: CLEAR
COLOR UR: YELLOW
GLUCOSE UR STRIP-MCNC: NEGATIVE MG/DL
NITRITE UR QL STRIP: NEGATIVE
PH UR STRIP: 5.5 [PH]
PROT UR STRIP-MCNC: 100 MG/DL
SP GR UR STRIP: 1.02
UROBILINOGEN UR STRIP-ACNC: <2 MG/DL

## 2024-10-17 PROCEDURE — 87086 URINE CULTURE/COLONY COUNT: CPT

## 2024-10-17 PROCEDURE — 87186 SC STD MICRODIL/AGAR DIL: CPT | Performed by: NURSE PRACTITIONER

## 2024-10-17 PROCEDURE — 87077 CULTURE AEROBIC IDENTIFY: CPT | Performed by: NURSE PRACTITIONER

## 2024-10-17 RX ORDER — PHENAZOPYRIDINE HYDROCHLORIDE 200 MG/1
200 TABLET, FILM COATED ORAL 3 TIMES DAILY PRN
Qty: 6 TABLET | Refills: 0 | Status: SHIPPED | OUTPATIENT
Start: 2024-10-17 | End: 2024-10-22

## 2024-10-17 RX ORDER — NITROFURANTOIN 25; 75 MG/1; MG/1
100 CAPSULE ORAL 2 TIMES DAILY
Qty: 14 CAPSULE | Refills: 0 | Status: SHIPPED | OUTPATIENT
Start: 2024-10-17 | End: 2024-10-24

## 2024-10-17 NOTE — ED PROVIDER NOTES
Patient Seen in: [facility]    History   CC: \"I think I have a UTI\"  HPI: Mitzi Miles 84 year old female  who presents c/o burning dysuria and frequency beginning today.  History of UTIs however has not had a UTI in the last 2 years.  Denies abdominal pain, back or flank pain, hematuria, fever or chills.    Past Medical History:    Blepharitis of both eyes    Colon polyp    Mid transverse polyp- tubular adenoma, no dysplasia or malignancy     Esophageal reflux    High cholesterol    Irritable bowel syndrome    quiescent    Obstructive sleep apnea and central apnea    [facility] AHI 32 cental apnea AHI 19 SaO2 deric 80 %    Shingles    Subacute episode on nose    Sleep apnea    Visual impairment    glasses       Past Surgical History:   Procedure Laterality Date    Colonoscopy  1984 ,     Colonoscopy  2015    snare polypectomy, Int hemorrhoids, Diverticulitis    Other surgical history      Bladder reconstuction-Kim[facility]ly    Total abdom hysterectomy      yovanny oophorectomy    Upper gi endoscopy - referral  2015    Hiatal hernia,GE junction,erythematous mucosa-antrum, biopsies done    Upper gi endoscopy,exam         Family History   Problem Relation Age of Onset    Heart Attack Paternal Grandfather     Other (Alzheimer's) Mother          at 84    Cancer Father 76        lung    Stroke Maternal Grandmother     Other (TB) Paternal Grandmother        Social History     Socioeconomic History    Marital status:    Occupational History    Occupation:     Tobacco Use    Smoking status: Never     Passive exposure: Never    Smokeless tobacco: Never   Vaping Use    Vaping status: Never Used   Substance and Sexual Activity    Alcohol use: No    Drug use: No    Sexual activity: Not Currently   Other Topics Concern    Caffeine Concern No    Exercise Yes     Comment: walker, Y 3x week        ROS:  Systems reviewed: All pertinent positives noted in HPI. Unless  otherwise noted, additional systems reviewed are negative.   Vital signs reviewed.    Positive for stated complaint: uti symptoms  Other systems are as noted in HPI.  Constitutional and vital signs reviewed.      All other systems reviewed and negative except as noted above.    PSFH elements reviewed from today and agreed except as otherwise stated in HPI.             Constitutional and vital signs reviewed.        Physical Exam     ED Triage Vitals [10/17/24 1547]   BP (!) 160/94   Pulse 83   Resp 22   Temp 97.9 °F (36.6 °C)   Temp src Temporal   SpO2 95 %   O2 Device None (Room air)       Current:BP (!) 160/94   Pulse 83   Temp 97.9 °F (36.6 °C) (Temporal)   Resp 22   Ht 160 cm (5' 3\")   Wt 66.7 kg   LMP 07/23/1992 (LMP Unknown)   SpO2 95%   BMI 26.04 kg/m²         PE:  General - Appears well, non-toxic and in NAD  Head - Appears symmetrical without deformity/swelling cranium, scalp, or facial bones  GI - Appears round and flat, BS +x4 quadrants, no tenderness/guarding with palpation   - no CVA tenderness.   Skin - no rashes or petechiae noted, pink warm and dry throughout, mmm, cap refill <2seconds  Neuro - A&O x4, steady gait  MSK - makes purposeful movements of all extremities.  Psych - Interactive and appropriate      ED Course     Labs Reviewed   URINE CULTURE, ROUTINE       MDM     DDx: Cystitis, pyelonephritis, vaginitis    Urine dip positive for ketones, blood and leukocyte esterase.  Dedicated urine culture pending.  We will empirically initiate Macrobid as patient has had this in the past and tolerated well.  Last urine culture on chart review was performed in February 2022 and grew E. coli which was pansensitive.  Patient is also requesting Pyridium.  General cystitis instructions reviewed, hydration instructions, follow-up and return/ED precautions reviewed. Patient is historian and demonstrates understanding of all instruction and agrees with plan of care.      Disposition and Plan      Clinical Impression:  1. Cystitis        Disposition:  Discharge    Follow-up:  Grace Figueroa MD  [address] [place] [address]  [phone]    Go in 1 week  As needed      Medications Prescribed:  Current Discharge Medication List        START taking these medications    Details   nitrofurantoin monohydrate macro 100 MG Oral Cap Take 1 capsule (100 mg total) by mouth 2 (two) times daily for 7 days.  Qty: 14 capsule, Refills: 0      phenazopyridine 200 MG Oral Tab Take 1 tablet (200 mg total) by mouth 3 (three) times daily as needed for Pain.  Qty: 6 tablet, Refills: 0

## 2024-10-17 NOTE — ED INITIAL ASSESSMENT (HPI)
Patient states urinary frequency, urgency and burning with urination that started this pm  Denies any flank pain or fever

## 2024-10-22 ENCOUNTER — OFFICE VISIT (OUTPATIENT)
Dept: INTERNAL MEDICINE CLINIC | Facility: CLINIC | Age: 84
End: 2024-10-22
Payer: MEDICARE

## 2024-10-22 VITALS
HEIGHT: 62 IN | HEART RATE: 92 BPM | TEMPERATURE: 98 F | RESPIRATION RATE: 18 BRPM | WEIGHT: 148.63 LBS | OXYGEN SATURATION: 100 % | SYSTOLIC BLOOD PRESSURE: 132 MMHG | DIASTOLIC BLOOD PRESSURE: 80 MMHG | BODY MASS INDEX: 27.35 KG/M2

## 2024-10-22 DIAGNOSIS — R03.0 ELEVATED BP WITHOUT DIAGNOSIS OF HYPERTENSION: ICD-10-CM

## 2024-10-22 DIAGNOSIS — L71.9 ROSACEA: ICD-10-CM

## 2024-10-22 DIAGNOSIS — R30.0 DYSURIA: Primary | ICD-10-CM

## 2024-10-22 DIAGNOSIS — N39.0 RECURRENT UTI: ICD-10-CM

## 2024-10-22 LAB
BILIRUBIN: NEGATIVE
GLUCOSE (URINE DIPSTICK): 100 MG/DL
KETONES (URINE DIPSTICK): NEGATIVE MG/DL
MULTISTIX LOT#: ABNORMAL NUMERIC
NITRITE, URINE: POSITIVE
PH, URINE: 5.5 (ref 4.5–8)
PROTEIN (URINE DIPSTICK): 30 MG/DL
SPECIFIC GRAVITY: 1.02 (ref 1–1.03)
UROBILINOGEN,SEMI-QN: 1 MG/DL (ref 0–1.9)

## 2024-10-22 PROCEDURE — 87086 URINE CULTURE/COLONY COUNT: CPT | Performed by: NURSE PRACTITIONER

## 2024-10-22 PROCEDURE — 87186 SC STD MICRODIL/AGAR DIL: CPT | Performed by: NURSE PRACTITIONER

## 2024-10-22 PROCEDURE — 81003 URINALYSIS AUTO W/O SCOPE: CPT | Performed by: NURSE PRACTITIONER

## 2024-10-22 PROCEDURE — 87077 CULTURE AEROBIC IDENTIFY: CPT | Performed by: NURSE PRACTITIONER

## 2024-10-22 PROCEDURE — 99214 OFFICE O/P EST MOD 30 MIN: CPT | Performed by: NURSE PRACTITIONER

## 2024-10-22 RX ORDER — CEPHALEXIN 500 MG/1
500 CAPSULE ORAL 2 TIMES DAILY
Qty: 14 CAPSULE | Refills: 0 | Status: SHIPPED | OUTPATIENT
Start: 2024-10-22

## 2024-10-22 RX ORDER — PHENAZOPYRIDINE HYDROCHLORIDE 200 MG/1
200 TABLET, FILM COATED ORAL 3 TIMES DAILY PRN
Qty: 6 TABLET | Refills: 0 | Status: SHIPPED | OUTPATIENT
Start: 2024-10-22 | End: 2024-10-27

## 2024-10-22 NOTE — PROGRESS NOTES
Mitzi Miles is a 84 year old female.    Chief Complaint   Patient presents with    UTI     ES rm - 10 -  painful urination with burning with frequency and pressure since 10/17.       HPI:   Presents for unresolved dysuria and post void urgency    Presented to  10/17 with dysuria that started am prior to visit.    Urine culture 10/17 10-50,000 klebsiella   treated with macrobid  confirmed sensitivity on culture results.  Prior to this appears UTI Feb 2022 (treated with macrobid).    No diarrhea.  No fever or chills.  No back pain and no hematuria.    Her symptoms have never improved.  Persistent dysuria and post void urgency.    Urine dip today  positive nitrite and small leukocytes.      Recently started doxy per derm for Roscea  she has since stopped as she was nto certain it helped.  She will continue to hold until UTI fully treated.     HTN  bp elevated at .  164/94  today good.   She does monitor at home as well and has been good.      Patient Active Problem List   Diagnosis    Obstructive sleep apnea, mild-oral appliance    Pure hypercholesterolemia    Hiatal hernia    Gastroesophageal reflux disease    Osteopenia    Actinic keratoses    Rosacea    Glucose intolerance    Pulmonary granuloma of histoplasmosis (HCC)    Multinodular goiter (nontoxic)    OAB (overactive bladder)    RODRIGUES (dyspnea on exertion)     Current Outpatient Medications   Medication Sig Dispense Refill    cephALEXin 500 MG Oral Cap Take 1 capsule (500 mg total) by mouth 2 (two) times daily. 14 capsule 0    phenazopyridine 200 MG Oral Tab Take 1 tablet (200 mg total) by mouth 3 (three) times daily as needed for Pain. 6 tablet 0    omeprazole 20 MG Oral Capsule Delayed Release Take 1 capsule (20 mg total) by mouth every morning. 90 capsule 3    simvastatin 20 MG Oral Tab Take 1 tablet (20 mg total) by mouth nightly. 90 tablet 3    metRONIDAZOLE 0.75 % External Cream Apply 0.75 % topically 2 (two) times daily.      Sulfacetamide  Sodium-Sulfur 10-5 % External Liquid Apply 5 % topically in the morning and 5 % before bedtime.      Ergocalciferol (VITAMIN D OR) Take by mouth.      Calcium Carbonate-Vitamin D 500-125 MG-UNIT Oral Tab Take by mouth. Take one tablet daily        Past Medical History:    Blepharitis of both eyes    Colon polyp    Mid transverse polyp- tubular adenoma, no dysplasia or malignancy     Esophageal reflux    High cholesterol    Irritable bowel syndrome    quiescent    Obstructive sleep apnea and central apnea    [facility] AHI 32 cental apnea AHI 19 SaO2 deric 80 %    Shingles    Subacute episode on nose    Sleep apnea    Visual impairment    glasses      Social History:  Social History     Socioeconomic History    Marital status:    Occupational History    Occupation:     Tobacco Use    Smoking status: Never     Passive exposure: Never    Smokeless tobacco: Never   Vaping Use    Vaping status: Never Used   Substance and Sexual Activity    Alcohol use: No    Drug use: No    Sexual activity: Not Currently   Other Topics Concern    Caffeine Concern No    Exercise Yes     Comment: walker, Y 3x week      Family History   Problem Relation Age of Onset    Heart Attack Paternal Grandfather     Other (Alzheimer's) Mother          at 84    Cancer Father 76        lung    Stroke Maternal Grandmother     Other (TB) Paternal Grandmother         Allergies  Allergies[1]    REVIEW OF SYSTEMS:   GENERAL HEALTH: feels well otherwise    as above   GI: denies abdominal pain and denies heartburn, no diarrhea or constipation  MUSCULOSKELETAL:  No arthralgias or myalgias  NEURO: denies headaches,     EXAM:   /80 (BP Location: Left arm, Patient Position: Sitting, Cuff Size: large)   Pulse 92   Temp 97.6 °F (36.4 °C) (Temporal)   Resp 18   Ht 5' 2\" (1.575 m)   Wt 148 lb 9.6 oz (67.4 kg)   LMP 1992 (LMP Unknown)   SpO2 100%   BMI 27.18 kg/m²   GENERAL: well developed, well nourished,in no apparent  distress  LUNGS: normal rate without respiratory distress, lungs clear to auscultation  CARDIO: RRR without murmur  GI: normal bowel sounds, no masses, HSM or tenderness  no CVA tenderness.  Bladder soft.    EXTREMITIES: no edema, normal strength and tone  PSYCH: alert and oriented x 3    ASSESSMENT AND PLAN:     Encounter Diagnoses   Name Primary?    Dysuria  resend culture although will not be fully accurate on macrobid.  Change to keflex  refill pyridium.   Yes    Recurrent UTI     Elevated BP without diagnosis of hypertension  montiors at home. Contineu  stable here.       Rosacea  hold doxy for now.         Orders Placed This Encounter   Procedures    Urine Dip, auto without Micro    Urine Culture, Routine [E]       Meds & Refills for this Visit:  Requested Prescriptions     Signed Prescriptions Disp Refills    cephALEXin 500 MG Oral Cap 14 capsule 0     Sig: Take 1 capsule (500 mg total) by mouth 2 (two) times daily.    phenazopyridine 200 MG Oral Tab 6 tablet 0     Sig: Take 1 tablet (200 mg total) by mouth 3 (three) times daily as needed for Pain.       Imaging & Consults:  None    No follow-ups on file.  There are no Patient Instructions on file for this visit.       [1] No Known Allergies

## 2024-10-30 ENCOUNTER — TELEPHONE (OUTPATIENT)
Dept: INTERNAL MEDICINE CLINIC | Facility: CLINIC | Age: 84
End: 2024-10-30

## 2024-10-30 DIAGNOSIS — R30.0 DYSURIA: Primary | ICD-10-CM

## 2024-10-30 NOTE — TELEPHONE ENCOUNTER
Pt called. She had office visit on 10/22 for UTI. She completed antibiotics for UTI yesterday. She states burning with urination never subsided, but today the burning is worse than it had been while on antibiotic. Frequency and pressure have resolved.     CHIN Ng - do you want pt to repeat UA?

## 2024-10-30 NOTE — TELEPHONE ENCOUNTER
She has been treated now with 2 antibiotics which showed sensitivities to each.  Will place order for UA  would hold antibiotics until this information is available   please have her go to [facility] to leave sample.    If persistent, she will need to see .  Please schedule her for ov to check vaginitis panel. BD has openings on Friday.

## 2024-10-30 NOTE — TELEPHONE ENCOUNTER
Called patient, informed of UA order and to have done today or tomorrow if possible. Scheduled follow up appointment with BD on 11/1 at 9am    Future Appointments   Date Time Provider Department Center   11/1/2024  9:00 AM Jessica Ling APRN [facility]

## 2024-10-31 ENCOUNTER — LAB ENCOUNTER (OUTPATIENT)
Dept: LAB | Age: 84
End: 2024-10-31
Attending: NURSE PRACTITIONER
Payer: MEDICARE

## 2024-10-31 DIAGNOSIS — R30.0 DYSURIA: ICD-10-CM

## 2024-10-31 LAB
BILIRUB UR QL STRIP.AUTO: NEGATIVE
CLARITY UR REFRACT.AUTO: CLEAR
COLOR UR AUTO: YELLOW
GLUCOSE UR STRIP.AUTO-MCNC: NORMAL MG/DL
KETONES UR STRIP.AUTO-MCNC: NEGATIVE MG/DL
LEUKOCYTE ESTERASE UR QL STRIP.AUTO: NEGATIVE
NITRITE UR QL STRIP.AUTO: NEGATIVE
PH UR STRIP.AUTO: 5 [PH] (ref 5–8)
PROT UR STRIP.AUTO-MCNC: NEGATIVE MG/DL
RBC UR QL AUTO: NEGATIVE
SP GR UR STRIP.AUTO: 1.01 (ref 1–1.03)
UROBILINOGEN UR STRIP.AUTO-MCNC: NORMAL MG/DL

## 2024-10-31 PROCEDURE — 81003 URINALYSIS AUTO W/O SCOPE: CPT

## 2024-11-01 ENCOUNTER — OFFICE VISIT (OUTPATIENT)
Dept: INTERNAL MEDICINE CLINIC | Facility: CLINIC | Age: 84
End: 2024-11-01
Payer: MEDICARE

## 2024-11-01 VITALS
DIASTOLIC BLOOD PRESSURE: 75 MMHG | WEIGHT: 149 LBS | OXYGEN SATURATION: 98 % | RESPIRATION RATE: 16 BRPM | HEART RATE: 73 BPM | SYSTOLIC BLOOD PRESSURE: 110 MMHG | HEIGHT: 62 IN | BODY MASS INDEX: 27.42 KG/M2 | TEMPERATURE: 97 F

## 2024-11-01 DIAGNOSIS — R30.0 DYSURIA: Primary | ICD-10-CM

## 2024-11-01 PROCEDURE — 1159F MED LIST DOCD IN RCRD: CPT

## 2024-11-01 PROCEDURE — 1160F RVW MEDS BY RX/DR IN RCRD: CPT

## 2024-11-01 PROCEDURE — 99214 OFFICE O/P EST MOD 30 MIN: CPT

## 2024-11-01 PROCEDURE — 81514 NFCT DS BV&VAGINITIS DNA ALG: CPT

## 2024-11-01 RX ORDER — PHENAZOPYRIDINE HYDROCHLORIDE 200 MG/1
1 TABLET, FILM COATED ORAL 3 TIMES DAILY PRN
COMMUNITY

## 2024-11-01 NOTE — PROGRESS NOTES
Mitzi Miles is a 84 year old female.   Chief Complaint   Patient presents with    Follow - Up     Rm 12 SS UTI follow up. Still has burning.      HPI:    Patient here today for follow up on dysuria with negative cultures. She does still report dysuria. Patient denies vaginal discharge, vaginal itching or dryness. No new products. Denies holding her urine. Patient denies body aches, chills, fever, abdominal pain/cramping.    Allergies:  Allergies[1]   Current Meds:  Current Outpatient Medications   Medication Sig Dispense Refill    phenazopyridine 200 MG Oral Tab Take 1 tablet (200 mg total) by mouth 3 (three) times daily as needed.      omeprazole 20 MG Oral Capsule Delayed Release Take 1 capsule (20 mg total) by mouth every morning. 90 capsule 3    simvastatin 20 MG Oral Tab Take 1 tablet (20 mg total) by mouth nightly. 90 tablet 3    Ergocalciferol (VITAMIN D OR) Take by mouth.      Calcium Carbonate-Vitamin D 500-125 MG-UNIT Oral Tab Take by mouth. Take one tablet daily      cephALEXin 500 MG Oral Cap Take 1 capsule (500 mg total) by mouth 2 (two) times daily. (Patient not taking: Reported on 11/1/2024) 14 capsule 0    metRONIDAZOLE 0.75 % External Cream Apply 0.75 % topically 2 (two) times daily. (Patient not taking: Reported on 11/1/2024)      Sulfacetamide Sodium-Sulfur 10-5 % External Liquid Apply 5 % topically in the morning and 5 % before bedtime. (Patient not taking: Reported on 11/1/2024)          PMH:     Past Medical History:    Blepharitis of both eyes    Colon polyp    Mid transverse polyp- tubular adenoma, no dysplasia or malignancy     Esophageal reflux    High cholesterol    Irritable bowel syndrome    quiescent    Obstructive sleep apnea and central apnea    [facility] AHI 32 cental apnea AHI 19 SaO2 deric 80 %    Shingles    Subacute episode on nose    Sleep apnea    Visual impairment    glasses       ROS:   Review of Systems   Constitutional: Negative.    Genitourinary:  Positive for dysuria.  Negative for decreased urine volume, difficulty urinating, flank pain, frequency, hematuria, pelvic pain, urgency, vaginal bleeding, vaginal discharge and vaginal pain.   Musculoskeletal: Negative.    Psychiatric/Behavioral: Negative.              PHYSICAL EXAM:    /75   Pulse 73   Temp 97.1 °F (36.2 °C) (Temporal)   Resp 16   Ht 5' 2\" (1.575 m)   Wt 149 lb (67.6 kg)   LMP 07/23/1992 (LMP Unknown)   SpO2 98%   BMI 27.25 kg/m²   Physical Exam  Constitutional:       Appearance: Normal appearance.   Pulmonary:      Effort: Pulmonary effort is normal.   Abdominal:      General: Bowel sounds are normal. There is no distension.      Palpations: Abdomen is soft.      Tenderness: There is no abdominal tenderness.      Hernia: No hernia is present.   Skin:     General: Skin is warm and dry.   Neurological:      Mental Status: She is alert.            ASSESSMENT/ PLAN:   1. Dysuria  Recent culture negative, vaginitis-vaginosis swab completed. Discussed if testing negative will pursue US and urology consult. Discussed hydration source- avoid ocean spray cranberry juice. Call if symptoms change, further management pending laboratory findings.   - Vaginitis Vaginosis PCR Panel; Future  - Vaginitis Vaginosis PCR Panel      Health Maintenance Due   Topic Date Due    COVID-19 Vaccine (7 - 2023-24 season) 09/01/2024    Influenza Vaccine (1) 10/01/2024     Pt indicates understanding and agrees to the plan.     No follow-ups on file.    CHIN Watkins          [1] No Known Allergies

## 2024-11-02 LAB
BV BACTERIA DNA VAG QL NAA+PROBE: NEGATIVE
C GLABRATA DNA VAG QL NAA+PROBE: NEGATIVE
C KRUSEI DNA VAG QL NAA+PROBE: NEGATIVE
CANDIDA DNA VAG QL NAA+PROBE: NEGATIVE
T VAGINALIS DNA VAG QL NAA+PROBE: NEGATIVE

## 2024-11-05 ENCOUNTER — TELEPHONE (OUTPATIENT)
Dept: INTERNAL MEDICINE CLINIC | Facility: CLINIC | Age: 84
End: 2024-11-05

## 2024-11-05 DIAGNOSIS — R30.0 DYSURIA: Primary | ICD-10-CM

## 2024-11-05 DIAGNOSIS — N39.0 RECURRENT UTI: ICD-10-CM

## 2024-11-05 NOTE — TELEPHONE ENCOUNTER
Pt called regarding vaginitis panel test results. Relayed CHIN Watkins's result note and recommendation. She states she is still having vaginal burning.     CHIN Watkins - urology referral pended, will you order ultrasound as well?

## 2024-11-05 NOTE — TELEPHONE ENCOUNTER
Ultrasound ordered for her to complete for further work up, hopefully she can complete before seeing urology.

## 2024-11-05 NOTE — TELEPHONE ENCOUNTER
I sent result note to clinical to obtain symptom update which I don't see patient had been contacted.       Urology consult signed and Ultrasound kidney bladder entered with pre and post void residual.

## 2024-11-05 NOTE — TELEPHONE ENCOUNTER
\"November 2, 2024     S    11/2/24 12:46 PM  System released results to [employer]: Vaginitis Vaginosis PCR Panel  November 3, 2024  Jessica Ling, CHIN to [facility] Regarding result: Vaginitis Vaginosis PCR Panel   BD    11/3/24  4:42 PM  Result Note  Negative testing. If patient still with symptoms I will order an ultrasound and recommend urology consult. Please obtain update if still symptomatic.\"        FYI BD - Result note was sent to , not clinical staff.       Called and spoke with pt. Notified pt of recommendations from BD. Provided contact information. Pt verbalizes understanding and is agreeable. Pt looking for medication. Notified pt results are normal and therefore not showing an infection and so medication is not being prescribed. Advised to schedule with urology and call with worsening symptoms.

## 2024-11-07 ENCOUNTER — TELEPHONE (OUTPATIENT)
Dept: INTERNAL MEDICINE CLINIC | Facility: CLINIC | Age: 84
End: 2024-11-07

## 2024-11-07 NOTE — TELEPHONE ENCOUNTER
Spoke with patient regarding information listed below she stated she is not symptomatic.     Message  Received: 4 days ago  Jessica Ling APRN  P [facility]  Negative testing. If patient still with symptoms I will order an ultrasound and recommend urology consult. Please obtain update if still symptomatic.

## 2025-01-08 ENCOUNTER — TELEPHONE (OUTPATIENT)
Dept: INTERNAL MEDICINE CLINIC | Facility: CLINIC | Age: 85
End: 2025-01-08

## 2025-01-08 DIAGNOSIS — Z00.00 ROUTINE GENERAL MEDICAL EXAMINATION AT A HEALTH CARE FACILITY: Primary | ICD-10-CM

## 2025-01-08 DIAGNOSIS — E78.00 PURE HYPERCHOLESTEROLEMIA: ICD-10-CM

## 2025-01-08 NOTE — TELEPHONE ENCOUNTER
Patient called request labs prior to their annual physical.  Annual physical scheduled for 3/6/25  Please order labs. Patient preferred lab is [facility]  Patient informed request was sent to clinical team.  Patient informed to fast for labs.  No callback required.

## 2025-02-12 DIAGNOSIS — E78.00 PURE HYPERCHOLESTEROLEMIA: ICD-10-CM

## 2025-02-14 RX ORDER — SIMVASTATIN 20 MG
20 TABLET ORAL NIGHTLY
Qty: 90 TABLET | Refills: 1 | Status: SHIPPED | OUTPATIENT
Start: 2025-02-14

## 2025-02-14 RX ORDER — SIMVASTATIN 20 MG
20 TABLET ORAL NIGHTLY
Qty: 90 TABLET | Refills: 3 | Status: SHIPPED | OUTPATIENT
Start: 2025-02-14 | End: 2025-02-14

## 2025-02-14 NOTE — TELEPHONE ENCOUNTER
Medication showed Refill Per Protocol   Passed and failed   Please review     Requested Prescriptions   Pending Prescriptions Disp Refills    SIMVASTATIN 20 MG Oral Tab [Pharmacy Med Name: SIMVASTATIN 20 MG TABLET] 90 tablet 3     Sig: TAKE 1 TABLET BY MOUTH EVERY DAY AT NIGHT       Cholesterol Medication Protocol Failed - 2/14/2025 12:52 PM        Failed - ALT < 80     Lab Results   Component Value Date    ALT 16 02/14/2024             Failed - ALT resulted within past year        Failed - Lipid panel within past 12 months     Lab Results   Component Value Date    CHOLEST 181 02/14/2024    TRIG 61 02/14/2024    HDL 73 (H) 02/14/2024    LDL 96 02/14/2024    VLDL 10 02/14/2024    TCHDLRATIO 2.70 05/15/2018    NONHDLC 108 02/14/2024             Passed - In person appointment or virtual visit in the past 12 mos or appointment in next 3 mos     Recent Outpatient Visits              3 months ago Dysuria    [address] Jessica Ling APRN    Office Visit    3 months ago Dysuria    [address]    Office Visit    11 months ago Encounter for wellness examination    [address] Grace Figueroa MD    Office Visit    1 year ago Gastroesophageal reflux disease, unspecified whether esophagitis present    [address] Grace Figueroa MD    Office Visit    1 year ago Encounter for annual health examination    [address] Grace Figueroa MD    Office Visit          Future Appointments         Provider Department Appt Notes    In 2 weeks Grace Figueroa MD [address] last cpe 2/23/24                        Passed - Medication is active on med list               Future Appointments         Provider Department Appt Notes    In 2 weeks Grace Figueroa MD  [facility], [address] [place] [address] last cpe 2/23/24              Recent Outpatient Visits              3 months ago Dysuria    [facility], [address] [place] [address] Jessica Ling APRN    Office Visit    3 months ago Dysuria    [facility], [address] [place] [address], [place] [place] APRN    Office Visit    11 months ago Encounter for wellness examination    [address]    Office Visit    1 year ago Gastroesophageal reflux disease, unspecified whether esophagitis present    [facility], [address] [place] [address] Grace Aguirre MD    Office Visit    1 year ago Encounter for annual health examination    [address]    Office Visit

## 2025-02-28 ENCOUNTER — LAB ENCOUNTER (OUTPATIENT)
Dept: LAB | Age: 85
End: 2025-02-28
Attending: INTERNAL MEDICINE
Payer: MEDICARE

## 2025-02-28 DIAGNOSIS — Z00.00 ROUTINE GENERAL MEDICAL EXAMINATION AT A HEALTH CARE FACILITY: ICD-10-CM

## 2025-02-28 DIAGNOSIS — R30.0 DYSURIA: ICD-10-CM

## 2025-02-28 DIAGNOSIS — E78.00 PURE HYPERCHOLESTEROLEMIA: ICD-10-CM

## 2025-02-28 LAB
ALBUMIN SERPL-MCNC: 4.1 G/DL (ref 3.2–4.8)
ALBUMIN/GLOB SERPL: 1.6 {RATIO} (ref 1–2)
ALP LIVER SERPL-CCNC: 119 U/L
ALT SERPL-CCNC: 10 U/L
ANION GAP SERPL CALC-SCNC: 9 MMOL/L (ref 0–18)
AST SERPL-CCNC: 22 U/L (ref ?–34)
BASOPHILS # BLD AUTO: 0.02 X10(3) UL (ref 0–0.2)
BASOPHILS NFR BLD AUTO: 0.4 %
BILIRUB SERPL-MCNC: 0.6 MG/DL (ref 0.2–1.1)
BILIRUB UR QL STRIP.AUTO: NEGATIVE
BUN BLD-MCNC: 12 MG/DL (ref 9–23)
CALCIUM BLD-MCNC: 9.4 MG/DL (ref 8.7–10.6)
CHLORIDE SERPL-SCNC: 108 MMOL/L (ref 98–112)
CHOLEST SERPL-MCNC: 193 MG/DL (ref ?–200)
CLARITY UR REFRACT.AUTO: CLEAR
CO2 SERPL-SCNC: 24 MMOL/L (ref 21–32)
COLOR UR AUTO: COLORLESS
CREAT BLD-MCNC: 0.85 MG/DL
EGFRCR SERPLBLD CKD-EPI 2021: 67 ML/MIN/1.73M2 (ref 60–?)
EOSINOPHIL # BLD AUTO: 0.08 X10(3) UL (ref 0–0.7)
EOSINOPHIL NFR BLD AUTO: 1.4 %
ERYTHROCYTE [DISTWIDTH] IN BLOOD BY AUTOMATED COUNT: 13 %
FASTING PATIENT LIPID ANSWER: YES
FASTING STATUS PATIENT QL REPORTED: YES
GLOBULIN PLAS-MCNC: 2.6 G/DL (ref 2–3.5)
GLUCOSE BLD-MCNC: 90 MG/DL (ref 70–99)
GLUCOSE UR STRIP.AUTO-MCNC: NORMAL MG/DL
HCT VFR BLD AUTO: 41 %
HDLC SERPL-MCNC: 58 MG/DL (ref 40–59)
HGB BLD-MCNC: 13.7 G/DL
IMM GRANULOCYTES # BLD AUTO: 0.01 X10(3) UL (ref 0–1)
IMM GRANULOCYTES NFR BLD: 0.2 %
KETONES UR STRIP.AUTO-MCNC: NEGATIVE MG/DL
LDLC SERPL CALC-MCNC: 118 MG/DL (ref ?–100)
LEUKOCYTE ESTERASE UR QL STRIP.AUTO: NEGATIVE
LYMPHOCYTES # BLD AUTO: 2.22 X10(3) UL (ref 1–4)
LYMPHOCYTES NFR BLD AUTO: 39.4 %
MCH RBC QN AUTO: 29.7 PG (ref 26–34)
MCHC RBC AUTO-ENTMCNC: 33.4 G/DL (ref 31–37)
MCV RBC AUTO: 88.9 FL
MONOCYTES # BLD AUTO: 0.44 X10(3) UL (ref 0.1–1)
MONOCYTES NFR BLD AUTO: 7.8 %
NEUTROPHILS # BLD AUTO: 2.87 X10 (3) UL (ref 1.5–7.7)
NEUTROPHILS # BLD AUTO: 2.87 X10(3) UL (ref 1.5–7.7)
NEUTROPHILS NFR BLD AUTO: 50.8 %
NITRITE UR QL STRIP.AUTO: NEGATIVE
NONHDLC SERPL-MCNC: 135 MG/DL (ref ?–130)
OSMOLALITY SERPL CALC.SUM OF ELEC: 291 MOSM/KG (ref 275–295)
PH UR STRIP.AUTO: 7 [PH] (ref 5–8)
PLATELET # BLD AUTO: 159 10(3)UL (ref 150–450)
POTASSIUM SERPL-SCNC: 3.9 MMOL/L (ref 3.5–5.1)
PROT SERPL-MCNC: 6.7 G/DL (ref 5.7–8.2)
PROT UR STRIP.AUTO-MCNC: NEGATIVE MG/DL
RBC # BLD AUTO: 4.61 X10(6)UL
RBC UR QL AUTO: NEGATIVE
SODIUM SERPL-SCNC: 141 MMOL/L (ref 136–145)
SP GR UR STRIP.AUTO: <1.005 (ref 1–1.03)
TRIGL SERPL-MCNC: 94 MG/DL (ref 30–149)
TSI SER-ACNC: 2.89 UIU/ML (ref 0.55–4.78)
UROBILINOGEN UR STRIP.AUTO-MCNC: NORMAL MG/DL
VLDLC SERPL CALC-MCNC: 16 MG/DL (ref 0–30)
WBC # BLD AUTO: 5.6 X10(3) UL (ref 4–11)

## 2025-02-28 PROCEDURE — 81003 URINALYSIS AUTO W/O SCOPE: CPT

## 2025-02-28 PROCEDURE — 84443 ASSAY THYROID STIM HORMONE: CPT

## 2025-02-28 PROCEDURE — 80053 COMPREHEN METABOLIC PANEL: CPT

## 2025-02-28 PROCEDURE — 80061 LIPID PANEL: CPT

## 2025-02-28 PROCEDURE — 36415 COLL VENOUS BLD VENIPUNCTURE: CPT

## 2025-02-28 PROCEDURE — 85025 COMPLETE CBC W/AUTO DIFF WBC: CPT

## 2025-03-06 ENCOUNTER — OFFICE VISIT (OUTPATIENT)
Dept: INTERNAL MEDICINE CLINIC | Facility: CLINIC | Age: 85
End: 2025-03-06
Payer: MEDICARE

## 2025-03-06 VITALS
HEIGHT: 61.81 IN | SYSTOLIC BLOOD PRESSURE: 124 MMHG | OXYGEN SATURATION: 97 % | RESPIRATION RATE: 16 BRPM | BODY MASS INDEX: 26.68 KG/M2 | HEART RATE: 70 BPM | WEIGHT: 145 LBS | DIASTOLIC BLOOD PRESSURE: 78 MMHG | TEMPERATURE: 97 F

## 2025-03-06 DIAGNOSIS — L71.9 ROSACEA: ICD-10-CM

## 2025-03-06 DIAGNOSIS — Z00.00 ENCOUNTER FOR MEDICARE ANNUAL WELLNESS EXAM: Primary | ICD-10-CM

## 2025-03-06 DIAGNOSIS — E74.39 GLUCOSE INTOLERANCE: ICD-10-CM

## 2025-03-06 DIAGNOSIS — E78.00 PURE HYPERCHOLESTEROLEMIA: ICD-10-CM

## 2025-03-06 DIAGNOSIS — Z12.31 ENCOUNTER FOR SCREENING MAMMOGRAM FOR MALIGNANT NEOPLASM OF BREAST: ICD-10-CM

## 2025-03-06 DIAGNOSIS — R06.09 DOE (DYSPNEA ON EXERTION): ICD-10-CM

## 2025-03-06 DIAGNOSIS — K21.9 GASTROESOPHAGEAL REFLUX DISEASE, UNSPECIFIED WHETHER ESOPHAGITIS PRESENT: ICD-10-CM

## 2025-03-06 DIAGNOSIS — Z78.0 POST-MENOPAUSAL: ICD-10-CM

## 2025-03-06 DIAGNOSIS — B39.2 PULMONARY GRANULOMA OF HISTOPLASMOSIS (HCC): ICD-10-CM

## 2025-03-06 DIAGNOSIS — R29.898 WEAKNESS OF LEFT SHOULDER: ICD-10-CM

## 2025-03-06 DIAGNOSIS — E04.2 MULTINODULAR GOITER (NONTOXIC): ICD-10-CM

## 2025-03-06 PROCEDURE — 1125F AMNT PAIN NOTED PAIN PRSNT: CPT | Performed by: INTERNAL MEDICINE

## 2025-03-06 PROCEDURE — G0439 PPPS, SUBSEQ VISIT: HCPCS | Performed by: INTERNAL MEDICINE

## 2025-03-06 PROCEDURE — 99214 OFFICE O/P EST MOD 30 MIN: CPT | Performed by: INTERNAL MEDICINE

## 2025-03-06 PROCEDURE — 96160 PT-FOCUSED HLTH RISK ASSMT: CPT | Performed by: INTERNAL MEDICINE

## 2025-03-06 PROCEDURE — 1160F RVW MEDS BY RX/DR IN RCRD: CPT | Performed by: INTERNAL MEDICINE

## 2025-03-06 PROCEDURE — 1159F MED LIST DOCD IN RCRD: CPT | Performed by: INTERNAL MEDICINE

## 2025-03-06 PROCEDURE — 1170F FXNL STATUS ASSESSED: CPT | Performed by: INTERNAL MEDICINE

## 2025-03-06 NOTE — PROGRESS NOTES
Subjective:   Mitzi Miles is a 84 year old female who presents for a MA AHA (Medicare Advantage Annual Health Assessment) and Subsequent Annual Wellness visit (Pt already had Initial Annual Wellness) and scheduled follow up of multiple significant but stable problems.   1. Encounter for Medicare annual wellness exam (Primary)-referred for mammogram and dexa scan, screening colonoscopy no longer indicated at age 84. She is up to date on vaccinations.   2. Pure hypercholesterolemia- fair control on simvastatin, no SE reported  3. Gastroesophageal reflux disease, unspecified whether esophagitis present- controlled on omeprazole  4. Glucose intolerance- resolved, BG normal on recent labs  5. Pulmonary granuloma of histoplasmosis (HCC)- chronic, patient asymptomatic. No cough or SOB  6. Multinodular goiter (nontoxic)- stable thyroid US, last one 2022. TSH was normal  7. RODRIGUES (dyspnea on exertion)- resolved, she had normal cardiac work up  8. Rosacea- stable, follows with derm  9. Encounter for screening mammogram for malignant neoplasm of breast  -     [facility] LUZ ELENA 2D+3D SCREENING BILAT (CPT=77067/99150); Future; Expected date: 03/06/2025  10. Post-menopausal  -     XR DEXA BONE DENSITOMETRY (CPT=77080); Future; Expected date: 03/06/2025  11. Weakness of left shoulder- symptoms since May 2024 after mechanical fall down the stairs onto the shoulder. She had   Closed fracture of left shoulder- still doing PT for it and it is slowly improving      History/Other:   Fall Risk Assessment:   She has been screened for Falls and is High Risk. Fall Prevention information provided to patient in After Visit Summary.    Do you feel unsteady when standing or walking?: (Patient-Rptd) No  Do you worry about falling?: (Patient-Rptd) Yes  Have you fallen in the past year?: (Patient-Rptd) Yes  How many times have you fallen?: (Patient-Rptd) (P) 1  Were you injured?: (Patient-Rptd) (P) Yes     Cognitive Assessment:   She had a completely  normal cognitive assessment - see flowsheet entries     Functional Ability/Status:   Mitzi Miles has some abnormal functions as listed below:  She has Vision problems based on screening of functional status.       Depression Screening (PHQ):  PHQ-2 SCORE: 0  , done 3/6/2025   Last Loving Suicide Screening on 3/6/2025 was No Risk.         Advanced Directives:   She does have a Living Will but we do NOT have it on file in Epic.    She does NOT have a Power of  for Health Care. [Do you have a healthcare power of ?: No]  Not discussed      Patient Active Problem List   Diagnosis    Obstructive sleep apnea, mild-oral appliance    Pure hypercholesterolemia    Hiatal hernia    Gastroesophageal reflux disease    Osteopenia    Actinic keratoses    Rosacea    Glucose intolerance    Pulmonary granuloma of histoplasmosis (HCC)    Multinodular goiter (nontoxic)    OAB (overactive bladder)    RODRIGUES (dyspnea on exertion)     Allergies:  She has No Known Allergies.    Current Medications:  Outpatient Medications Marked as Taking for the 3/6/25 encounter (Office Visit) with Grace Figueroa MD   Medication Sig    simvastatin 20 MG Oral Tab Take 1 tablet (20 mg total) by mouth nightly.    omeprazole 20 MG Oral Capsule Delayed Release Take 1 capsule (20 mg total) by mouth every morning.    Ergocalciferol (VITAMIN D OR) Take by mouth.    Calcium Carbonate-Vitamin D 500-125 MG-UNIT Oral Tab Take by mouth. Take one tablet daily       Medical History:  She  has a past medical history of Blepharitis of both eyes (8/6/12- Dr NEIL Ibrahim), Colon polyp (9/22/2015), Esophageal reflux, High cholesterol, Irritable bowel syndrome, Obstructive sleep apnea and central apnea (08/28/2017), Shingles (2013), Sleep apnea, and Visual impairment.  Surgical History:  She  has a past surgical history that includes colonoscopy (1984 2004, 2010); upper gi endoscopy,exam (2011); other surgical history (2009); total abdom hysterectomy  (1992); upper gi endoscopy - referral (9/22/2015); and colonoscopy (09/22/2015).   Family History:  Her family history includes Alzheimer's in her mother; Cancer (age of onset: 76) in her father; Heart Attack in her paternal grandfather; Stroke in her maternal grandmother; TB in her paternal grandmother.  Social History:  She  reports that she has never smoked. She has never been exposed to tobacco smoke. She has never used smokeless tobacco. She reports that she does not drink alcohol and does not use drugs.    Tobacco:  She has never smoked tobacco.    CAGE Alcohol Screen:   CAGE screening score of 0 on 2/27/2025, showing low risk of alcohol abuse.      Patient Care Team:  Grace Figueroa MD as PCP - General (Internal Medicine)  Pj Cisse MD (GASTROENTEROLOGY)  Knobloch, Keeley, PT as Physical Therapist  Sindhu Newell APRN (Nurse Practitioner)  Jessica Ling APRN as Registered Nurse (Nurse Practitioner Family)    Review of Systems     Negative     Objective:   Physical Exam     General Appearance:  Alert, cooperative, no distress, appears stated age   Head:  Normocephalic, without obvious abnormality, atraumatic   Eyes:  PERRL, conjunctiva/corneas clear, EOM's intact both eyes   Ears:  Normal TM's and external ear canals, both ears   Nose: Nares normal, septum midline,mucosa normal   Throat: Lips, mucosa, and tongue normal; teeth and gums normal   Neck: Supple, symmetrical, trachea midline, no JVD       Lungs:   Clear to auscultation bilaterally, respirations unlabored   Heart:  Regular rate and rhythm, S1 and S2 normal   Abdomen:   Soft, non-tender, bowel sounds active all four quadrants,  no masses, no organomegaly   Pelvic: Deferred   Extremities: Left shoulder with limited ROM and weakness   Pulses: 2+ and symmetric   Skin: Facial rosacea   Lymph nodes: Cervical, supraclavicular, and axillary nodes normal   Neurologic: Alert and oriented       /78 (BP Location: Right arm, Patient Position:  Sitting, Cuff Size: adult)   Pulse 70   Temp 97.2 °F (36.2 °C) (Temporal)   Resp 16   Ht 5' 1.81\" (1.57 m)   Wt 145 lb (65.8 kg)   LMP 07/23/1992 (LMP Unknown)   SpO2 97%   Breastfeeding No   BMI 26.68 kg/m²  Estimated body mass index is 26.68 kg/m² as calculated from the following:    Height as of this encounter: 5' 1.81\" (1.57 m).    Weight as of this encounter: 145 lb (65.8 kg).    Medicare Hearing Assessment:   Hearing Screening    Time taken: 3/6/2025  4:41 PM  Entry User: Concetta Fay CMA  Screening Method: Finger Rub  Finger Rub Result: Pass         Visual Acuity:   Right Eye Visual Acuity: Corrected Right Eye Chart Acuity: 20/50   Left Eye Visual Acuity: Corrected Left Eye Chart Acuity: 20/100   Both Eyes Visual Acuity: Corrected Both Eyes Chart Acuity: 20/40   Able To Tolerate Visual Acuity: Yes        Assessment & Plan:   Mitzi Miles is a 84 year old female who presents for a Medicare Assessment.     1. Encounter for Medicare annual wellness exam (Primary)-referred for mammogram and dexa scan, screening colonoscopy no longer indicated at age 84. She is up to date on vaccinations.   2. Pure hypercholesterolemia- fair control on simvastatin, CPM  3. Gastroesophageal reflux disease, unspecified whether esophagitis present- controlled on omeprazole, CPM  4. Glucose intolerance- resolved, BG last month WNL  5. Pulmonary granuloma of histoplasmosis (HCC)- chronic, patient asymptomatic. She will see pulmonary if any symptoms arise  6. Multinodular goiter (nontoxic)- stable US, last one 2022  7. RODRIGUES (dyspnea on exertion)- resolved, she had normal cardiac work up  8. Rosacea- stable, follows with derm  -     Derm Referral - In Network  9. Encounter for screening mammogram for malignant neoplasm of breast  -     [facility] LUZ ELENA 2D+3D SCREENING BILAT (CPT=77067/00505); Future; Expected date: 03/06/2025  10. Post-menopausal  -     XR DEXA BONE DENSITOMETRY (CPT=77080); Future; Expected date: 03/06/2025  11.  Weakness of left shoulder- symptoms since May 2024 after mechanical fall down the stairs onto the shoulder. She had   Closed fracture of left shoulder- still doing PT for it and it is slowly improving     The patient indicates understanding of these issues and agrees to the plan.  Continue with current treatment plan.  Reinforced healthy diet, lifestyle, and exercise.      Return in about 1 year (around 3/6/2026), or if symptoms worsen or fail to improve, for annual.     Grace Figueroa MD, 3/6/2025     Supplementary Documentation:   General Health:  In the past six months, have you lost more than 10 pounds without trying?: (Patient-Rptd) 2 - No  Has your appetite been poor?: (Patient-Rptd) No  Type of Diet: (Patient-Rptd) Balanced  How does the patient maintain a good energy level?: (Patient-Rptd) Appropriate Exercise  How would you describe your daily physical activity?: (Patient-Rptd) Moderate  How would you describe your current health state?: (Patient-Rptd) Good  How do you maintain positive mental well-being?: (Patient-Rptd) Social Interaction  On a scale of 0 to 10, with 0 being no pain and 10 being severe pain, what is your pain level?: (Patient-Rptd) 1 - (Mild)  In the past six months, have you experienced urine leakage?: (Patient-Rptd) 1-Yes  At any time do you feel concerned for the safety/well-being of yourself and/or your children, in your home or elsewhere?: No  Have you had any immunizations at another office such as Influenza, Hepatitis B, Tetanus, or Pneumococcal?: (Patient-Rptd) Yes    Health Maintenance   Topic Date Due    Annual Well Visit  01/01/2025    Annual Depression Screening  01/01/2025    COVID-19 Vaccine (8 - 2024-25 season) 06/13/2025    Influenza Vaccine  Completed    DEXA Scan  Completed    Fall Risk Screening (Annual)  Completed    Pneumococcal Vaccine: 50+ Years  Completed    Zoster Vaccines  Completed    Meningococcal B Vaccine  Aged Out

## 2025-03-10 ENCOUNTER — TELEPHONE (OUTPATIENT)
Dept: INTERNAL MEDICINE CLINIC | Facility: CLINIC | Age: 85
End: 2025-03-10

## 2025-03-10 NOTE — TELEPHONE ENCOUNTER
Patient called. She was ordered an ultrasound kidney/bladder in Nov for recurrent urinary symptoms. She was advised to get US done if symptoms continued. Patient states she has been asymptomatic since then and wants ordered removed from her chart. Order cancelled. Advised patient to schedule an appointment for any further symptoms. She confirms understanding.

## 2025-04-15 ENCOUNTER — HOSPITAL ENCOUNTER (OUTPATIENT)
Age: 85
Discharge: HOME | End: 2025-04-15
Payer: MEDICARE

## 2025-04-15 VITALS
OXYGEN SATURATION: 98 % | DIASTOLIC BLOOD PRESSURE: 74 MMHG | HEART RATE: 66 BPM | TEMPERATURE: 98 F | SYSTOLIC BLOOD PRESSURE: 163 MMHG | RESPIRATION RATE: 17 BRPM

## 2025-04-15 DIAGNOSIS — N30.00 ACUTE CYSTITIS WITHOUT HEMATURIA: Primary | ICD-10-CM

## 2025-04-15 LAB
BILIRUB UR QL STRIP: NEGATIVE
COLOR UR: YELLOW
GLUCOSE UR STRIP-MCNC: NEGATIVE MG/DL
KETONES UR STRIP-MCNC: NEGATIVE MG/DL
NITRITE UR QL STRIP: NEGATIVE
PH UR STRIP: 5.5 [PH]
PROT UR STRIP-MCNC: 100 MG/DL
SP GR UR STRIP: 1.01
UROBILINOGEN UR STRIP-ACNC: <2 MG/DL

## 2025-04-15 PROCEDURE — 99214 OFFICE O/P EST MOD 30 MIN: CPT | Performed by: NURSE PRACTITIONER

## 2025-04-15 PROCEDURE — 81002 URINALYSIS NONAUTO W/O SCOPE: CPT | Performed by: NURSE PRACTITIONER

## 2025-04-15 RX ORDER — NITROFURANTOIN 25; 75 MG/1; MG/1
100 CAPSULE ORAL 2 TIMES DAILY
Qty: 20 CAPSULE | Refills: 0 | Status: SHIPPED | OUTPATIENT
Start: 2025-04-15 | End: 2025-04-25

## 2025-04-15 NOTE — ED INITIAL ASSESSMENT (HPI)
Pt here c/o urinary symptoms.   Pressure on urination.  Frequent urination.   Denies blood in urine, denies abd/back pain.   Denies fever.

## 2025-04-15 NOTE — ED PROVIDER NOTES
Patient Seen in: [facility]      History     Chief Complaint   Patient presents with    Urinary Symptoms     Stated Complaint: UTI    Subjective:   84-year-old female who presents with urinary frequency and urgency starting today.  Reports prior history of UTIs with similar symptomology.  Denies fevers.  Denies vomiting.  Denies abdominal pain.  Denies back pain.          History of Present Illness               Objective:     Past Medical History:    Blepharitis of both eyes    Colon polyp    Mid transverse polyp- tubular adenoma, no dysplasia or malignancy     Esophageal reflux    High cholesterol    Irritable bowel syndrome    quiescent    Obstructive sleep apnea and central apnea    Edward AHI 32 cental apnea AHI 19 SaO2 deric 80 %    Shingles    Subacute episode on nose    Sleep apnea    Visual impairment    glasses              Past Surgical History:   Procedure Laterality Date    Colonoscopy  1984 2004, 2010    Colonoscopy  09/22/2015    snare polypectomy, Int hemorrhoids, Diverticulitis    Other surgical history  2009    Bladder reconstuction-[facility]    Total abdom hysterectomy  1992    yovanny oophorectomy    Upper gi endoscopy - referral  9/22/2015    Hiatal hernia,GE junction,erythematous mucosa-antrum, biopsies done    Upper gi endoscopy,exam  2011                No pertinent social history.            Review of Systems   Constitutional: Negative.    Genitourinary:  Positive for frequency and urgency.   All other systems reviewed and are negative.      Positive for stated complaint: UTI  Other systems are as noted in HPI.  Constitutional and vital signs reviewed.      All other systems reviewed and negative except as noted above.                  Physical Exam     ED Triage Vitals [04/15/25 1725]   BP (!) 163/74   Pulse 66   Resp 17   Temp 97.8 °F (36.6 °C)   Temp src Oral   SpO2 98 %   O2 Device None (Room air)       Current Vitals:   Vital Signs  BP: (!) 163/74  Pulse: 66  Resp:  17  Temp: 97.8 °F (36.6 °C)  Temp src: Oral    Oxygen Therapy  SpO2: 98 %  O2 Device: None (Room air)        Physical Exam  Vitals and nursing note reviewed.   Constitutional:       General: She is not in acute distress.     Appearance: Normal appearance. She is not ill-appearing, toxic-appearing or diaphoretic.   Cardiovascular:      Rate and Rhythm: Normal rate and regular rhythm.      Pulses: Normal pulses.      Heart sounds: Normal heart sounds.   Pulmonary:      Effort: Pulmonary effort is normal. No respiratory distress.      Breath sounds: Normal breath sounds. No stridor. No wheezing, rhonchi or rales.   Abdominal:      General: Abdomen is flat.      Palpations: Abdomen is soft.      Tenderness: There is no abdominal tenderness. There is no right CVA tenderness or left CVA tenderness.   Skin:     General: Skin is warm and dry.      Coloration: Skin is not jaundiced or pale.   Neurological:      Mental Status: She is alert and oriented to person, place, and time.   Psychiatric:         Behavior: Behavior normal.           Physical Exam                ED Course     Labs Reviewed   [facility] POCT URINALYSIS DIPSTICK - Abnormal; Notable for the following components:       Result Value    Urine Clarity Cloudy (*)     Protein urine 100 (*)     Blood, Urine Moderate (*)     Leukocyte esterase urine Moderate (*)     All other components within normal limits   URINE CULTURE, ROUTINE          Results                                 MDM              Medical Decision Making  84-year-old female with frequency and urgency starting today.  No abdominal tenderness.  Urine dip positive for leukocytes.  Will empirically treat with Macrobid for UTI.  Differential diagnosis also considered but unlikely include pyelonephritis, acute abdomen.    Supportive/home management of diagnosis/illness/injury discussed. Red flag symptoms discussed.  Signs and symptoms/criteria that would necessitate reevaluation, including ER evaluation  discussed.  Patient and/or responsible adult verbalize and agree with management and plan of care.    Speech recognition software was used during this dictation.  There may be minor errors in transcription.      Amount and/or Complexity of Data Reviewed  Labs: ordered. Decision-making details documented in ED Course.    Risk  Prescription drug management.        Disposition and Plan     Clinical Impression:  1. Acute cystitis without hematuria         Disposition:  Discharge  4/15/2025  5:41 pm    Follow-up:  Grace Figueroa MD  [address] [place] [id number]  [phone]    Call in 2 days      Emergency department    Go to   If symptoms worsen          Medications Prescribed:  Current Discharge Medication List        START taking these medications    Details   nitrofurantoin monohydrate macro 100 MG Oral Cap Take 1 capsule (100 mg total) by mouth 2 (two) times daily for 10 days.  Qty: 20 capsule, Refills: 0             Supplementary Documentation:

## 2025-07-28 ENCOUNTER — HOSPITAL ENCOUNTER (OUTPATIENT)
Age: 85
Discharge: HOME | End: 2025-07-28

## 2025-07-28 ENCOUNTER — APPOINTMENT (OUTPATIENT)
Dept: GENERAL RADIOLOGY | Age: 85
End: 2025-07-28
Attending: NURSE PRACTITIONER

## 2025-07-28 VITALS
HEART RATE: 73 BPM | RESPIRATION RATE: 16 BRPM | DIASTOLIC BLOOD PRESSURE: 81 MMHG | OXYGEN SATURATION: 99 % | TEMPERATURE: 98 F | SYSTOLIC BLOOD PRESSURE: 130 MMHG
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
## 2025-07-28 DIAGNOSIS — S99.929A INJURY OF FOOT, UNSPECIFIED LATERALITY, INITIAL ENCOUNTER: ICD-10-CM

## 2025-07-28 PROCEDURE — 99213 OFFICE O/P EST LOW 20 MIN: CPT | Performed by: NURSE PRACTITIONER

## 2025-07-28 PROCEDURE — 73610 X-RAY EXAM OF ANKLE: CPT | Performed by: NURSE PRACTITIONER

## 2025-07-28 PROCEDURE — 73630 X-RAY EXAM OF FOOT: CPT | Performed by: NURSE PRACTITIONER

## 2025-08-13 DIAGNOSIS — K21.00 GASTROESOPHAGEAL REFLUX DISEASE WITH ESOPHAGITIS: ICD-10-CM

## 2025-08-14 DIAGNOSIS — E78.00 PURE HYPERCHOLESTEROLEMIA: ICD-10-CM

## 2025-08-15 RX ORDER — OMEPRAZOLE 20 MG/1
20 CAPSULE, DELAYED RELEASE ORAL EVERY MORNING
Qty: 90 CAPSULE | Refills: 3 | OUTPATIENT
Start: 2025-08-15

## 2025-08-18 RX ORDER — SIMVASTATIN 20 MG
20 TABLET ORAL NIGHTLY
Qty: 90 TABLET | Refills: 3 | Status: SHIPPED | OUTPATIENT
Start: 2025-08-18

## (undated) DIAGNOSIS — R30.0 DYSURIA: ICD-10-CM

## (undated) DIAGNOSIS — K21.00 GASTROESOPHAGEAL REFLUX DISEASE WITH ESOPHAGITIS: ICD-10-CM

## (undated) NOTE — LETTER
02/28/18        [address] [facility] [address]      Dear Froylan Catalan,    Our records indicate that you have outstanding lab work and or testing that was ordered for you and has not yet been completed:    Lipid Panel [E]  Comp

## (undated) NOTE — LETTER
03/26/21        [address] [facility] [address]      Dear Froylan Catalan,    [facility] records indicate that you have outstanding lab work and or testing that was ordered for you and has not yet been completed:  Orders Placed This Encou

## (undated) NOTE — MR AVS SNAPSHOT
[address], [address] [place] [id number] [phone]               Thank you for choosing us for your health care visit with Nuria Howard MD.  We are glad to serve you and happy to provide you with this Component Value Standard Range & Units    C-Reactive Protein <0.29 <1.00 mg/dL                MONOCLONAL PROTEIN STUDY      Component Value Standard Range & Units    Total Protein,Serum 6.8 6.1-8.3 g/dL    Albumin, Serum 4.18 3.50-4.80 g/dL    Alpha-1 Gl

## (undated) NOTE — LETTER
07/01/20        [address] [facility] [address] [address]      Dear Aditi Iraheta,    [facility] records indicate that you have outstanding lab work and or testing that was ordered for you and has not yet been completed:  Orders Placed This Encou

## (undated) NOTE — MR AVS SNAPSHOT
[address], [address] [place] [address]  Alejandro Mcclendon [address] [phone]               Thank you for choosing us for your health care visit with [facility].   We are glad to serve you and happy to provide you with this summary [Z00.00], Pure hypercholesterolemia [E78.00]           CBC W Differential W Platelet [E]    Complete by:  Jan 18, 2017 (Approximate)    Assoc Dx:  Routine general medical examination at a health care facility [Z00.00]           Comp Metabolic Panel (14) [E